# Patient Record
Sex: MALE | Race: WHITE | Employment: OTHER | URBAN - METROPOLITAN AREA
[De-identification: names, ages, dates, MRNs, and addresses within clinical notes are randomized per-mention and may not be internally consistent; named-entity substitution may affect disease eponyms.]

---

## 2014-07-14 LAB
CREATININE, URINE: 44
MICROALBUMIN/CREAT 24H UR: 0.2 MG/G{CREAT}
MICROALBUMIN/CREAT UR-RTO: NORMAL

## 2016-09-14 LAB
AVERAGE GLUCOSE: NORMAL
CHOLESTEROL, TOTAL: NORMAL
CHOLESTEROL/HDL RATIO: NORMAL
HBA1C MFR BLD: 5.7 %
HDLC SERPL-MCNC: NORMAL MG/DL
LDL CHOLESTEROL CALCULATED: NORMAL
TRIGL SERPL-MCNC: NORMAL MG/DL
VLDLC SERPL CALC-MCNC: NORMAL MG/DL

## 2018-04-09 ENCOUNTER — HOSPITAL ENCOUNTER (OUTPATIENT)
Dept: PULMONOLOGY | Age: 68
Discharge: HOME | End: 2018-04-09
Payer: COMMERCIAL

## 2018-04-09 PROCEDURE — 94642 AEROSOL INHALATION TREATMENT: CPT

## 2018-05-03 ENCOUNTER — HOSPITAL ENCOUNTER (OUTPATIENT)
Dept: PULMONOLOGY | Age: 68
Discharge: HOME | End: 2018-05-03

## 2018-05-10 ENCOUNTER — HOSPITAL ENCOUNTER (OUTPATIENT)
Dept: PULMONOLOGY | Age: 68
Discharge: HOME | End: 2018-05-10
Payer: COMMERCIAL

## 2018-05-10 PROCEDURE — 94640 AIRWAY INHALATION TREATMENT: CPT

## 2018-05-10 PROCEDURE — 94642 AEROSOL INHALATION TREATMENT: CPT

## 2018-06-11 ENCOUNTER — HOSPITAL ENCOUNTER (OUTPATIENT)
Dept: PULMONOLOGY | Age: 68
Discharge: HOME | End: 2018-06-11
Payer: COMMERCIAL

## 2018-06-11 PROCEDURE — 94642 AEROSOL INHALATION TREATMENT: CPT

## 2018-06-11 PROCEDURE — 94640 AIRWAY INHALATION TREATMENT: CPT

## 2018-07-12 ENCOUNTER — HOSPITAL ENCOUNTER (OUTPATIENT)
Dept: PULMONOLOGY | Age: 68
Discharge: HOME | End: 2018-07-12
Payer: COMMERCIAL

## 2018-07-12 PROCEDURE — 94640 AIRWAY INHALATION TREATMENT: CPT

## 2018-07-17 ENCOUNTER — HOSPITAL ENCOUNTER (OUTPATIENT)
Dept: PULMONOLOGY | Age: 68
Discharge: HOME | End: 2018-07-17

## 2018-07-19 VITALS
BODY MASS INDEX: 27.66 KG/M2 | HEIGHT: 64 IN | RESPIRATION RATE: 18 BRPM | SYSTOLIC BLOOD PRESSURE: 110 MMHG | OXYGEN SATURATION: 99 % | HEART RATE: 66 BPM | DIASTOLIC BLOOD PRESSURE: 60 MMHG | WEIGHT: 162 LBS

## 2018-08-02 ENCOUNTER — HOSPITAL ENCOUNTER (OUTPATIENT)
Age: 68
Discharge: HOME | End: 2018-08-02

## 2018-08-02 PROCEDURE — 9900000058 HC PULMONARY REHAB PHASE 3

## 2018-08-16 ENCOUNTER — HOSPITAL ENCOUNTER (OUTPATIENT)
Dept: PULMONOLOGY | Age: 68
Discharge: HOME | End: 2018-08-16
Payer: COMMERCIAL

## 2018-08-16 PROCEDURE — 94642 AEROSOL INHALATION TREATMENT: CPT

## 2018-09-04 ENCOUNTER — HOSPITAL ENCOUNTER (OUTPATIENT)
Age: 68
Discharge: HOME | End: 2018-09-04

## 2018-09-04 PROCEDURE — 9900000058 HC PULMONARY REHAB PHASE 3

## 2018-09-17 ENCOUNTER — HOSPITAL ENCOUNTER (OUTPATIENT)
Dept: PULMONOLOGY | Age: 68
Discharge: HOME | End: 2018-09-17
Payer: COMMERCIAL

## 2018-09-17 PROCEDURE — 94642 AEROSOL INHALATION TREATMENT: CPT

## 2018-10-11 ENCOUNTER — APPOINTMENT (OUTPATIENT)
Dept: PULMONOLOGY | Age: 68
End: 2018-10-11
Payer: COMMERCIAL

## 2018-10-11 ENCOUNTER — HOSPITAL ENCOUNTER (OUTPATIENT)
Dept: PULMONOLOGY | Age: 68
Setting detail: THERAPIES SERIES
Discharge: HOME | End: 2018-10-11
Payer: COMMERCIAL

## 2018-10-16 ENCOUNTER — HOSPITAL ENCOUNTER (OUTPATIENT)
Dept: PULMONOLOGY | Age: 68
Setting detail: THERAPIES SERIES
Discharge: HOME | End: 2018-10-16
Payer: COMMERCIAL

## 2018-10-16 PROCEDURE — G0424 PULMONARY REHAB W EXER: HCPCS

## 2018-10-18 ENCOUNTER — HOSPITAL ENCOUNTER (OUTPATIENT)
Dept: PULMONOLOGY | Age: 68
Discharge: HOME | End: 2018-10-18
Payer: COMMERCIAL

## 2018-10-18 PROCEDURE — 94640 AIRWAY INHALATION TREATMENT: CPT

## 2018-10-18 PROCEDURE — 94642 AEROSOL INHALATION TREATMENT: CPT

## 2018-10-23 ENCOUNTER — HOSPITAL ENCOUNTER (OUTPATIENT)
Dept: PULMONOLOGY | Age: 68
Setting detail: THERAPIES SERIES
Discharge: HOME | End: 2018-10-23
Payer: COMMERCIAL

## 2018-10-23 PROCEDURE — G0424 PULMONARY REHAB W EXER: HCPCS

## 2018-10-25 ENCOUNTER — HOSPITAL ENCOUNTER (OUTPATIENT)
Dept: PULMONOLOGY | Age: 68
Setting detail: THERAPIES SERIES
Discharge: HOME | End: 2018-10-25
Payer: COMMERCIAL

## 2018-10-25 PROCEDURE — G0424 PULMONARY REHAB W EXER: HCPCS

## 2018-11-06 ENCOUNTER — HOSPITAL ENCOUNTER (OUTPATIENT)
Dept: PULMONOLOGY | Age: 68
Setting detail: THERAPIES SERIES
Discharge: HOME | End: 2018-11-06
Payer: COMMERCIAL

## 2018-11-06 PROCEDURE — G0424 PULMONARY REHAB W EXER: HCPCS

## 2018-11-12 ENCOUNTER — HOSPITAL ENCOUNTER (OUTPATIENT)
Dept: PULMONOLOGY | Age: 68
Discharge: HOME | End: 2018-11-12
Payer: COMMERCIAL

## 2018-11-12 PROCEDURE — 94642 AEROSOL INHALATION TREATMENT: CPT

## 2018-11-12 PROCEDURE — 94664 DEMO&/EVAL PT USE INHALER: CPT

## 2018-11-27 ENCOUNTER — HOSPITAL ENCOUNTER (OUTPATIENT)
Dept: PULMONOLOGY | Age: 68
Setting detail: THERAPIES SERIES
Discharge: HOME | End: 2018-11-27
Payer: COMMERCIAL

## 2018-11-27 PROCEDURE — G0424 PULMONARY REHAB W EXER: HCPCS

## 2018-11-29 ENCOUNTER — HOSPITAL ENCOUNTER (OUTPATIENT)
Dept: PULMONOLOGY | Age: 68
Setting detail: THERAPIES SERIES
Discharge: HOME | End: 2018-11-29
Payer: COMMERCIAL

## 2018-11-29 PROCEDURE — G0424 PULMONARY REHAB W EXER: HCPCS

## 2018-12-04 ENCOUNTER — HOSPITAL ENCOUNTER (OUTPATIENT)
Dept: PULMONOLOGY | Age: 68
Setting detail: THERAPIES SERIES
Discharge: HOME | End: 2018-12-04
Payer: COMMERCIAL

## 2018-12-04 PROCEDURE — G0424 PULMONARY REHAB W EXER: HCPCS

## 2018-12-18 ENCOUNTER — HOSPITAL ENCOUNTER (OUTPATIENT)
Dept: PULMONOLOGY | Age: 68
Setting detail: THERAPIES SERIES
Discharge: HOME | End: 2018-12-18
Payer: COMMERCIAL

## 2018-12-18 PROCEDURE — G0424 PULMONARY REHAB W EXER: HCPCS

## 2018-12-20 ENCOUNTER — HOSPITAL ENCOUNTER (OUTPATIENT)
Dept: PULMONOLOGY | Age: 68
Setting detail: THERAPIES SERIES
Discharge: HOME | End: 2018-12-20
Payer: COMMERCIAL

## 2018-12-20 ENCOUNTER — HOSPITAL ENCOUNTER (OUTPATIENT)
Dept: PULMONOLOGY | Age: 68
Discharge: HOME | End: 2018-12-20
Payer: COMMERCIAL

## 2018-12-20 PROCEDURE — G0424 PULMONARY REHAB W EXER: HCPCS

## 2018-12-20 PROCEDURE — 94642 AEROSOL INHALATION TREATMENT: CPT

## 2018-12-20 PROCEDURE — 94664 DEMO&/EVAL PT USE INHALER: CPT

## 2018-12-27 ENCOUNTER — HOSPITAL ENCOUNTER (OUTPATIENT)
Dept: PULMONOLOGY | Age: 68
Setting detail: THERAPIES SERIES
Discharge: HOME | End: 2018-12-27
Payer: COMMERCIAL

## 2018-12-27 PROCEDURE — G0424 PULMONARY REHAB W EXER: HCPCS

## 2019-01-03 ENCOUNTER — HOSPITAL ENCOUNTER (OUTPATIENT)
Age: 69
Discharge: HOME | End: 2019-01-03

## 2019-01-03 PROCEDURE — 9900000058 HC PULMONARY REHAB PHASE 3

## 2019-01-21 ENCOUNTER — HOSPITAL ENCOUNTER (OUTPATIENT)
Age: 69
Discharge: HOME | End: 2019-01-21
Payer: COMMERCIAL

## 2019-01-21 PROCEDURE — 94664 DEMO&/EVAL PT USE INHALER: CPT

## 2019-01-21 PROCEDURE — 94642 AEROSOL INHALATION TREATMENT: CPT

## 2019-01-30 LAB

## 2019-02-05 ENCOUNTER — HOSPITAL ENCOUNTER (OUTPATIENT)
Age: 69
Discharge: HOME | End: 2019-02-05

## 2019-02-22 ENCOUNTER — HOSPITAL ENCOUNTER (OUTPATIENT)
Dept: PULMONOLOGY | Age: 69
Discharge: HOME | End: 2019-02-22
Payer: COMMERCIAL

## 2019-02-22 PROCEDURE — 94642 AEROSOL INHALATION TREATMENT: CPT

## 2019-03-05 ENCOUNTER — HOSPITAL ENCOUNTER (OUTPATIENT)
Age: 69
Discharge: HOME | End: 2019-03-05

## 2019-03-22 ENCOUNTER — HOSPITAL ENCOUNTER (OUTPATIENT)
Dept: PULMONOLOGY | Age: 69
Discharge: HOME | End: 2019-03-22
Payer: COMMERCIAL

## 2019-03-22 PROCEDURE — 94640 AIRWAY INHALATION TREATMENT: CPT

## 2019-03-22 PROCEDURE — 94642 AEROSOL INHALATION TREATMENT: CPT

## 2019-04-22 ENCOUNTER — HOSPITAL ENCOUNTER (OUTPATIENT)
Dept: PULMONOLOGY | Age: 69
Discharge: HOME | End: 2019-04-22
Payer: COMMERCIAL

## 2019-04-22 PROCEDURE — 94642 AEROSOL INHALATION TREATMENT: CPT

## 2019-05-23 ENCOUNTER — HOSPITAL ENCOUNTER (OUTPATIENT)
Dept: PULMONOLOGY | Age: 69
Discharge: HOME | End: 2019-05-23
Payer: COMMERCIAL

## 2019-05-23 PROCEDURE — 94664 DEMO&/EVAL PT USE INHALER: CPT

## 2019-05-23 PROCEDURE — 94642 AEROSOL INHALATION TREATMENT: CPT

## 2019-06-21 ENCOUNTER — HOSPITAL ENCOUNTER (OUTPATIENT)
Dept: PULMONOLOGY | Age: 69
Discharge: HOME | End: 2019-06-21
Payer: COMMERCIAL

## 2019-06-21 VITALS — OXYGEN SATURATION: 97 % | RESPIRATION RATE: 18 BRPM

## 2019-06-21 PROCEDURE — 94640 AIRWAY INHALATION TREATMENT: CPT

## 2019-06-21 PROCEDURE — 94642 AEROSOL INHALATION TREATMENT: CPT

## 2019-07-22 ENCOUNTER — HOSPITAL ENCOUNTER (OUTPATIENT)
Dept: PULMONOLOGY | Age: 69
Discharge: HOME | End: 2019-07-22
Payer: COMMERCIAL

## 2019-07-22 PROCEDURE — 94642 AEROSOL INHALATION TREATMENT: CPT

## 2019-07-22 PROCEDURE — 94664 DEMO&/EVAL PT USE INHALER: CPT

## 2019-08-22 ENCOUNTER — HOSPITAL ENCOUNTER (OUTPATIENT)
Dept: PULMONOLOGY | Age: 69
Discharge: HOME | End: 2019-08-22
Payer: COMMERCIAL

## 2019-08-22 PROCEDURE — 94642 AEROSOL INHALATION TREATMENT: CPT

## 2019-09-23 ENCOUNTER — HOSPITAL ENCOUNTER (OUTPATIENT)
Dept: PULMONOLOGY | Age: 69
Discharge: HOME | End: 2019-09-23
Payer: COMMERCIAL

## 2019-09-23 PROCEDURE — 94640 AIRWAY INHALATION TREATMENT: CPT

## 2019-09-23 PROCEDURE — 94642 AEROSOL INHALATION TREATMENT: CPT

## 2019-10-21 ENCOUNTER — HOSPITAL ENCOUNTER (OUTPATIENT)
Dept: PULMONOLOGY | Age: 69
Discharge: HOME | End: 2019-10-21
Payer: COMMERCIAL

## 2019-10-21 PROCEDURE — 94642 AEROSOL INHALATION TREATMENT: CPT

## 2019-10-21 PROCEDURE — 94640 AIRWAY INHALATION TREATMENT: CPT

## 2019-11-21 ENCOUNTER — HOSPITAL ENCOUNTER (OUTPATIENT)
Dept: PULMONOLOGY | Age: 69
Discharge: HOME | End: 2019-11-21
Payer: COMMERCIAL

## 2019-11-21 PROCEDURE — 94642 AEROSOL INHALATION TREATMENT: CPT

## 2019-11-22 ENCOUNTER — OFFICE VISIT (OUTPATIENT)
Dept: FAMILY MEDICINE CLINIC | Age: 69
End: 2019-11-22
Payer: COMMERCIAL

## 2019-11-22 VITALS
BODY MASS INDEX: 30.42 KG/M2 | HEART RATE: 56 BPM | DIASTOLIC BLOOD PRESSURE: 72 MMHG | WEIGHT: 178.2 LBS | OXYGEN SATURATION: 98 % | HEIGHT: 64 IN | SYSTOLIC BLOOD PRESSURE: 138 MMHG | TEMPERATURE: 98.3 F

## 2019-11-22 DIAGNOSIS — F34.1 DYSTHYMIA: ICD-10-CM

## 2019-11-22 DIAGNOSIS — I25.84 CORONARY ARTERY DISEASE DUE TO CALCIFIED CORONARY LESION: ICD-10-CM

## 2019-11-22 DIAGNOSIS — Z94.2 LUNG TRANSPLANT RECIPIENT (HCC): Primary | ICD-10-CM

## 2019-11-22 DIAGNOSIS — K21.9 GASTROESOPHAGEAL REFLUX DISEASE, ESOPHAGITIS PRESENCE NOT SPECIFIED: ICD-10-CM

## 2019-11-22 DIAGNOSIS — I25.10 CORONARY ARTERY DISEASE DUE TO CALCIFIED CORONARY LESION: ICD-10-CM

## 2019-11-22 PROCEDURE — 99214 OFFICE O/P EST MOD 30 MIN: CPT | Performed by: FAMILY MEDICINE

## 2019-11-22 RX ORDER — MONTELUKAST SODIUM 10 MG/1
10 TABLET ORAL NIGHTLY
COMMUNITY
End: 2020-11-16

## 2019-11-22 RX ORDER — CYCLOSPORINE 25 MG/1
25 CAPSULE, GELATIN COATED ORAL DAILY
COMMUNITY

## 2019-11-22 RX ORDER — CYCLOSPORINE 100 MG/1
100 CAPSULE, GELATIN COATED ORAL 2 TIMES DAILY
COMMUNITY

## 2019-11-22 RX ORDER — ONDANSETRON 4 MG/1
4 TABLET, ORALLY DISINTEGRATING ORAL EVERY 8 HOURS PRN
COMMUNITY
End: 2021-06-02

## 2019-11-22 RX ORDER — METOPROLOL SUCCINATE 25 MG/1
12.5 TABLET, EXTENDED RELEASE ORAL DAILY
COMMUNITY
End: 2021-10-27

## 2019-11-22 RX ORDER — NITROGLYCERIN 0.4 MG/1
0.4 TABLET SUBLINGUAL EVERY 5 MIN PRN
COMMUNITY

## 2019-11-22 RX ORDER — AZITHROMYCIN 250 MG/1
250 TABLET, FILM COATED ORAL
COMMUNITY
End: 2020-05-15

## 2019-11-22 RX ORDER — LANOLIN ALCOHOL/MO/W.PET/CERES
400 CREAM (GRAM) TOPICAL DAILY
COMMUNITY

## 2019-11-22 RX ORDER — VALGANCICLOVIR 450 MG/1
450 TABLET, FILM COATED ORAL
COMMUNITY

## 2019-11-22 RX ORDER — METOLAZONE 5 MG/1
5 TABLET ORAL WEEKLY
COMMUNITY
End: 2020-05-15

## 2019-11-22 RX ORDER — GUAIFENESIN 400 MG/1
400 TABLET ORAL 4 TIMES DAILY PRN
COMMUNITY

## 2019-11-22 RX ORDER — OLANZAPINE 5 MG/1
5 TABLET ORAL NIGHTLY PRN
COMMUNITY
End: 2020-05-15

## 2019-11-22 RX ORDER — FUROSEMIDE 40 MG/1
80 TABLET ORAL
COMMUNITY
Start: 2019-08-28 | End: 2021-10-27

## 2019-11-22 RX ORDER — ISOSORBIDE DINITRATE 10 MG/1
5 TABLET ORAL 3 TIMES DAILY
COMMUNITY
End: 2022-05-25 | Stop reason: ALTCHOICE

## 2019-11-22 RX ORDER — PREDNISONE 1 MG/1
5 TABLET ORAL DAILY
COMMUNITY
End: 2022-05-25 | Stop reason: SDUPTHER

## 2019-11-22 RX ORDER — DOXYCYCLINE HYCLATE 50 MG/1
324 CAPSULE, GELATIN COATED ORAL
COMMUNITY
End: 2020-05-15

## 2019-11-22 RX ORDER — CALCIUM CARBONATE 200(500)MG
2 TABLET,CHEWABLE ORAL DAILY
COMMUNITY

## 2019-11-22 RX ORDER — PANTOPRAZOLE SODIUM 40 MG/1
40 TABLET, DELAYED RELEASE ORAL 2 TIMES DAILY
COMMUNITY
End: 2021-03-08

## 2019-11-22 RX ORDER — FLUOXETINE HYDROCHLORIDE 20 MG/1
20 CAPSULE ORAL DAILY
COMMUNITY
End: 2020-05-15 | Stop reason: SDUPTHER

## 2019-11-22 RX ORDER — FLUTICASONE PROPIONATE 50 MCG
1 SPRAY, SUSPENSION (ML) NASAL DAILY
COMMUNITY

## 2019-11-22 RX ORDER — PENTAMIDINE ISETHIONATE 300 MG/300MG
300 INHALANT RESPIRATORY (INHALATION) ONCE
COMMUNITY
End: 2020-05-15

## 2019-11-22 RX ORDER — HYDRALAZINE HYDROCHLORIDE 25 MG/1
TABLET, FILM COATED ORAL 3 TIMES DAILY
COMMUNITY
End: 2021-10-27

## 2019-11-22 ASSESSMENT — ENCOUNTER SYMPTOMS
SINUS PAIN: 0
NAUSEA: 0
TROUBLE SWALLOWING: 0
CONSTIPATION: 0
BACK PAIN: 0
WHEEZING: 0
SHORTNESS OF BREATH: 1
COUGH: 0
EYE PAIN: 0
DIARRHEA: 0
VOMITING: 0
ABDOMINAL PAIN: 0
COLOR CHANGE: 0
EYE DISCHARGE: 0
ALLERGIC/IMMUNOLOGIC NEGATIVE: 1
SORE THROAT: 0
CHEST TIGHTNESS: 0

## 2019-12-05 ENCOUNTER — OFFICE VISIT (OUTPATIENT)
Dept: FAMILY MEDICINE CLINIC | Age: 69
End: 2019-12-05
Payer: COMMERCIAL

## 2019-12-05 VITALS
DIASTOLIC BLOOD PRESSURE: 84 MMHG | HEART RATE: 87 BPM | BODY MASS INDEX: 27.45 KG/M2 | OXYGEN SATURATION: 100 % | SYSTOLIC BLOOD PRESSURE: 128 MMHG | TEMPERATURE: 98.4 F | WEIGHT: 160.8 LBS | HEIGHT: 64 IN

## 2019-12-05 DIAGNOSIS — R05.9 COUGH: Primary | ICD-10-CM

## 2019-12-05 LAB
INFLUENZA A ANTIBODY: NEGATIVE
INFLUENZA B ANTIBODY: NEGATIVE
RSV RAPID ANTIGEN: NEGATIVE

## 2019-12-05 PROCEDURE — 87804 INFLUENZA ASSAY W/OPTIC: CPT | Performed by: FAMILY MEDICINE

## 2019-12-05 PROCEDURE — 99213 OFFICE O/P EST LOW 20 MIN: CPT | Performed by: FAMILY MEDICINE

## 2019-12-05 PROCEDURE — 87807 RSV ASSAY W/OPTIC: CPT | Performed by: FAMILY MEDICINE

## 2019-12-05 RX ORDER — PREDNISONE 10 MG/1
10 TABLET ORAL
Qty: 15 TABLET | Refills: 0 | Status: SHIPPED | OUTPATIENT
Start: 2019-12-05 | End: 2019-12-10

## 2019-12-05 ASSESSMENT — ENCOUNTER SYMPTOMS
SINUS PAIN: 0
EYE PAIN: 0
VOMITING: 0
SORE THROAT: 0
COUGH: 1
TROUBLE SWALLOWING: 0
COLOR CHANGE: 0
CHEST TIGHTNESS: 0
CONSTIPATION: 0
EYE DISCHARGE: 0
SHORTNESS OF BREATH: 1
BACK PAIN: 0
DIARRHEA: 0
NAUSEA: 0
ALLERGIC/IMMUNOLOGIC NEGATIVE: 1
WHEEZING: 0
ABDOMINAL PAIN: 0

## 2019-12-23 ENCOUNTER — HOSPITAL ENCOUNTER (OUTPATIENT)
Dept: PULMONOLOGY | Age: 69
Discharge: HOME | End: 2019-12-23
Payer: COMMERCIAL

## 2019-12-23 PROCEDURE — 94642 AEROSOL INHALATION TREATMENT: CPT

## 2019-12-23 PROCEDURE — 94664 DEMO&/EVAL PT USE INHALER: CPT

## 2020-01-07 ENCOUNTER — TELEPHONE (OUTPATIENT)
Dept: FAMILY MEDICINE CLINIC | Age: 70
End: 2020-01-07

## 2020-01-21 ENCOUNTER — OFFICE VISIT (OUTPATIENT)
Dept: FAMILY MEDICINE CLINIC | Age: 70
End: 2020-01-21
Payer: MEDICARE

## 2020-01-21 VITALS
SYSTOLIC BLOOD PRESSURE: 130 MMHG | OXYGEN SATURATION: 98 % | DIASTOLIC BLOOD PRESSURE: 70 MMHG | HEART RATE: 64 BPM | TEMPERATURE: 97.8 F

## 2020-01-21 PROCEDURE — G0444 DEPRESSION SCREEN ANNUAL: HCPCS | Performed by: FAMILY MEDICINE

## 2020-01-21 PROCEDURE — 99213 OFFICE O/P EST LOW 20 MIN: CPT | Performed by: FAMILY MEDICINE

## 2020-01-21 RX ORDER — PREDNISONE 10 MG/1
10 TABLET ORAL
Qty: 15 TABLET | Refills: 0 | Status: SHIPPED | OUTPATIENT
Start: 2020-01-21 | End: 2020-01-26

## 2020-01-21 ASSESSMENT — PATIENT HEALTH QUESTIONNAIRE - PHQ9
2. FEELING DOWN, DEPRESSED OR HOPELESS: 1
SUM OF ALL RESPONSES TO PHQ9 QUESTIONS 1 & 2: 3
8. MOVING OR SPEAKING SO SLOWLY THAT OTHER PEOPLE COULD HAVE NOTICED. OR THE OPPOSITE, BEING SO FIGETY OR RESTLESS THAT YOU HAVE BEEN MOVING AROUND A LOT MORE THAN USUAL: 1
SUM OF ALL RESPONSES TO PHQ QUESTIONS 1-9: 8
9. THOUGHTS THAT YOU WOULD BE BETTER OFF DEAD, OR OF HURTING YOURSELF: 1
SUM OF ALL RESPONSES TO PHQ QUESTIONS 1-9: 8
1. LITTLE INTEREST OR PLEASURE IN DOING THINGS: 2
3. TROUBLE FALLING OR STAYING ASLEEP: 0
10. IF YOU CHECKED OFF ANY PROBLEMS, HOW DIFFICULT HAVE THESE PROBLEMS MADE IT FOR YOU TO DO YOUR WORK, TAKE CARE OF THINGS AT HOME, OR GET ALONG WITH OTHER PEOPLE: 0
4. FEELING TIRED OR HAVING LITTLE ENERGY: 3
6. FEELING BAD ABOUT YOURSELF - OR THAT YOU ARE A FAILURE OR HAVE LET YOURSELF OR YOUR FAMILY DOWN: 0
7. TROUBLE CONCENTRATING ON THINGS, SUCH AS READING THE NEWSPAPER OR WATCHING TELEVISION: 0

## 2020-01-21 ASSESSMENT — ENCOUNTER SYMPTOMS
ALLERGIC/IMMUNOLOGIC NEGATIVE: 1
EYE DISCHARGE: 0
BACK PAIN: 0
CONSTIPATION: 0
ABDOMINAL PAIN: 0
COLOR CHANGE: 0
VOMITING: 0
TROUBLE SWALLOWING: 0
SORE THROAT: 0
EYE PAIN: 0
CHEST TIGHTNESS: 0
WHEEZING: 0
SINUS PAIN: 0
SHORTNESS OF BREATH: 0
NAUSEA: 0
DIARRHEA: 0
COUGH: 0

## 2020-01-21 ASSESSMENT — COLUMBIA-SUICIDE SEVERITY RATING SCALE - C-SSRS
6. HAVE YOU EVER DONE ANYTHING, STARTED TO DO ANYTHING, OR PREPARED TO DO ANYTHING TO END YOUR LIFE?: NO
2. HAVE YOU ACTUALLY HAD ANY THOUGHTS OF KILLING YOURSELF?: NO
1. WITHIN THE PAST MONTH, HAVE YOU WISHED YOU WERE DEAD OR WISHED YOU COULD GO TO SLEEP AND NOT WAKE UP?: YES

## 2020-01-21 NOTE — PROGRESS NOTES
20    Loulou King : 1950 Sex: male  Age: 78 y.o. Chief Complaint   Patient presents with    Toe Pain     right    Other     phq 9 score - 8       HPI:  78 y.o. male here with pain in right foot-first mt-p joint. On dialysis now. Sees [facility] monthly. Review of Systems   Constitutional: Negative for chills, diaphoresis and fever. HENT: Negative for congestion, ear pain, sinus pain, sneezing, sore throat, tinnitus and trouble swallowing. Eyes: Negative for pain, discharge and visual disturbance. Respiratory: Negative for cough, chest tightness, shortness of breath and wheezing. Cardiovascular: Negative for chest pain, palpitations and leg swelling. Gastrointestinal: Negative for abdominal pain, constipation, diarrhea, nausea and vomiting. Endocrine: Negative for polydipsia and polyuria. Genitourinary: Negative for difficulty urinating, flank pain and frequency. Musculoskeletal: Negative for arthralgias, back pain, joint swelling and myalgias. Right foot pain. Skin: Negative for color change and rash. Allergic/Immunologic: Negative. Neurological: Negative for dizziness, tremors, seizures, syncope, weakness and light-headedness. Hematological: Negative for adenopathy. Psychiatric/Behavioral: Negative for behavioral problems, dysphoric mood and hallucinations. The patient is not nervous/anxious.           Current Outpatient Medications:     predniSONE (DELTASONE) 10 MG tablet, Take 1 tablet by mouth 3 times daily (with meals) for 5 days, Disp: 15 tablet, Rfl: 0    furosemide (LASIX) 40 MG tablet, Take 80 mg by mouth , Disp: , Rfl:     metOLazone (ZAROXOLYN) 5 MG tablet, Take 5 mg by mouth once a week, Disp: , Rfl:     ondansetron (ZOFRAN-ODT) 4 MG disintegrating tablet, Take 4 mg by mouth every 8 hours as needed for Nausea or Vomiting, Disp: , Rfl:     azithromycin (ZITHROMAX) 250 MG tablet, Take 250 mg by mouth, Disp: , Rfl:     isosorbide dinitrate (ISORDIL) 10 MG tablet, Take 5 mg by mouth 3 times daily, Disp: , Rfl:     folic acid (FOLVITE) 404 MCG tablet, Take 400 mcg by mouth daily, Disp: , Rfl:     ferrous gluconate (FERGON) 324 (38 Fe) MG tablet, Take 324 mg by mouth daily (with breakfast), Disp: , Rfl:     aspirin 81 MG tablet, Take 81 mg by mouth daily, Disp: , Rfl:     metoprolol succinate (TOPROL XL) 25 MG extended release tablet, Take 25 mg by mouth daily, Disp: , Rfl:     fluticasone (FLONASE) 50 MCG/ACT nasal spray, 1 spray by Each Nostril route daily, Disp: , Rfl:     cycloSPORINE (SANDIMMUNE) 100 MG capsule, Take 100 mg by mouth 2 times daily, Disp: , Rfl:     cycloSPORINE (SANDIMMUNE) 25 MG capsule, Take 25 mg by mouth daily, Disp: , Rfl:     valGANciclovir (VALCYTE) 450 MG tablet, Take 450 mg by mouth, Disp: , Rfl:     montelukast (SINGULAIR) 10 MG tablet, Take 10 mg by mouth nightly, Disp: , Rfl:     calcium carbonate (TUMS) 500 MG chewable tablet, Take 2 tablets by mouth daily, Disp: , Rfl:     predniSONE (DELTASONE) 5 MG tablet, Take 5 mg by mouth daily, Disp: , Rfl:     hydrALAZINE (APRESOLINE) 50 MG tablet, Take 25 mg by mouth 3 times daily , Disp: , Rfl:     FLUoxetine (PROZAC) 20 MG capsule, Take 20 mg by mouth daily, Disp: , Rfl:     pantoprazole (PROTONIX) 40 MG tablet, Take 40 mg by mouth 2 times daily, Disp: , Rfl:     nitroGLYCERIN (NITROSTAT) 0.4 MG SL tablet, Place 0.4 mg under the tongue every 5 minutes as needed for Chest pain up to max of 3 total doses.  If no relief after 1 dose, call 911., Disp: , Rfl:     OLANZapine (ZYPREXA) 5 MG tablet, Take 5 mg by mouth nightly as needed, Disp: , Rfl:     guaiFENesin 400 MG tablet, Take 400 mg by mouth 4 times daily as needed for Cough, Disp: , Rfl:     Simethicone 40 MG STRP, Take by mouth, Disp: , Rfl:     pentamidine (PENTAM) 300 MG inhalation solution, Inhale 300 mg into the lungs once, Disp: , Rfl:     vitamin D (ERGOCALCIFEROL) 13646 UNITS CAPS capsule, Take 2,000 Units by mouth daily , Disp: , Rfl:     Multiple Vitamins-Minerals (THERAPEUTIC MULTIVITAMIN-MINERALS) tablet, Take 1 tablet by mouth daily, Disp: , Rfl:   Allergies   Allergen Reactions    Ceftriaxone Itching     Itching at IV site & red streak along the arm within 5 minutes of infusion      Heparin Other (See Comments)     MEÑO      Lorazepam Other (See Comments)    Tacrolimus Other (See Comments)     Agitation         Past Medical History:   Diagnosis Date    Emphysema lung ([facility] [address]     Hypertension      Past Surgical History:   Procedure Laterality Date    LUNG TRANSPLANT, DOUBLE  2017     No family history on file.   Social History     Socioeconomic History    Marital status:      Spouse name: Not on file    Number of children: Not on file    Years of education: Not on file    Highest education level: Not on file   Occupational History    Not on file   Social Needs    Financial resource strain: Not on file    Food insecurity:     Worry: Not on file     Inability: Not on file    Transportation needs:     Medical: Not on file     Non-medical: Not on file   Tobacco Use    Smoking status: Former Smoker     Packs/day: 2.00     Years: 25.00     Pack years: 50.00     Last attempt to quit: 1997     Years since quittin.4    Smokeless tobacco: Never Used   Substance and Sexual Activity    Alcohol use: No    Drug use: No    Sexual activity: Not on file   Lifestyle    Physical activity:     Days per week: Not on file     Minutes per session: Not on file    Stress: Not on file   Relationships    Social connections:     Talks on phone: Not on file     Gets together: Not on file     Attends [employer] service: Not on file     Active member of club or organization: Not on file     Attends meetings of clubs or organizations: Not on file     Relationship status: Not on file    Intimate partner violence:     Fear of current or ex partner: Not on file     Emotionally abused: Not on file Physically abused: Not on file     Forced sexual activity: Not on file   Other Topics Concern    Not on file   Social History Narrative    Not on file       Vitals:    01/21/20 1635   BP: 130/70   Pulse: 64   Temp: 97.8 °F (36.6 °C)   TempSrc: Temporal   SpO2: 98%       Physical Exam  Vitals signs reviewed. Constitutional:       Appearance: He is well-developed. HENT:      Head: Normocephalic. Right Ear: Tympanic membrane normal.      Left Ear: Tympanic membrane normal.      Nose: Nose normal.      Comments: Nasal canula noted. Mouth/Throat:      Mouth: Mucous membranes are moist.      Pharynx: No posterior oropharyngeal erythema. Eyes:      Extraocular Movements: Extraocular movements intact. Conjunctiva/sclera: Conjunctivae normal.      Pupils: Pupils are equal, round, and reactive to light. Neck:      Musculoskeletal: Normal range of motion and neck supple. Vascular: No carotid bruit. Cardiovascular:      Rate and Rhythm: Normal rate and regular rhythm. Heart sounds: Normal heart sounds. No murmur. Pulmonary:      Effort: Pulmonary effort is normal.      Breath sounds: Normal breath sounds. Comments: BS diminished. Abdominal:      General: Bowel sounds are normal.      Palpations: Abdomen is soft. Musculoskeletal:      Comments: Erythematous tender right first mt-p joint. Skin:     General: Skin is warm and dry. Neurological:      Mental Status: He is alert and oriented to person, place, and time. POCT Orders   No Active POCT       Assessment and Plan:  Rey Burrell was seen today for toe pain and other. Diagnoses and all orders for this visit:    Right foot pain  -     XR FOOT RIGHT (MIN 3 VIEWS); Future    Other orders  -     predniSONE (DELTASONE) 10 MG tablet; Take 1 tablet by mouth 3 times daily (with meals) for 5 days    Reviewed Pmhx, meds, xrays. Start increase dose of pred. Recheck 2-3 days prn. Return if symptoms worsen or fail to improve.      Seen

## 2020-01-24 ENCOUNTER — HOSPITAL ENCOUNTER (OUTPATIENT)
Dept: PULMONOLOGY | Age: 70
Discharge: HOME | End: 2020-01-24
Payer: MEDICARE

## 2020-01-24 PROCEDURE — 94642 AEROSOL INHALATION TREATMENT: CPT

## 2020-01-24 NOTE — PROGRESS NOTES
Aerosol given with Albuterol and Nss prior to [facility] treatment. Pentamadine treatment given with 6 cc Sterile water. Tolerated well. Patient came in as outpatient to pft lab.

## 2020-01-29 SDOH — HEALTH STABILITY: MENTAL HEALTH: HOW OFTEN DO YOU HAVE A DRINK CONTAINING ALCOHOL?: NEVER

## 2020-02-10 ENCOUNTER — HOSPITAL ENCOUNTER (OUTPATIENT)
Age: 70
Discharge: HOME | End: 2020-02-12
Payer: MEDICARE

## 2020-02-10 ENCOUNTER — TELEPHONE (OUTPATIENT)
Dept: FAMILY MEDICINE CLINIC | Age: 70
End: 2020-02-10

## 2020-02-10 ENCOUNTER — OFFICE VISIT (OUTPATIENT)
Dept: FAMILY MEDICINE CLINIC | Age: 70
End: 2020-02-10
Payer: MEDICARE

## 2020-02-10 VITALS
DIASTOLIC BLOOD PRESSURE: 74 MMHG | WEIGHT: 187 LBS | TEMPERATURE: 97.7 F | BODY MASS INDEX: 31.92 KG/M2 | OXYGEN SATURATION: 98 % | SYSTOLIC BLOOD PRESSURE: 132 MMHG | HEART RATE: 69 BPM | HEIGHT: 64 IN

## 2020-02-10 LAB
INFLUENZA A ANTIBODY: NORMAL
INFLUENZA B ANTIBODY: NORMAL
RSV RAPID ANTIGEN: NORMAL

## 2020-02-10 PROCEDURE — 99215 OFFICE O/P EST HI 40 MIN: CPT | Performed by: FAMILY MEDICINE

## 2020-02-10 PROCEDURE — 87804 INFLUENZA ASSAY W/OPTIC: CPT | Performed by: FAMILY MEDICINE

## 2020-02-10 PROCEDURE — 0100U HC RESPIRPTHGN MULT REV TRANS & AMP PRB TECH 21 TRGT: CPT

## 2020-02-10 PROCEDURE — 87807 RSV ASSAY W/OPTIC: CPT | Performed by: FAMILY MEDICINE

## 2020-02-10 RX ORDER — PREDNISONE 10 MG/1
10 TABLET ORAL
Qty: 15 TABLET | Refills: 0 | Status: SHIPPED | OUTPATIENT
Start: 2020-02-10 | End: 2020-02-15

## 2020-02-10 ASSESSMENT — ENCOUNTER SYMPTOMS
WHEEZING: 0
NAUSEA: 0
SINUS PAIN: 0
COLOR CHANGE: 0
TROUBLE SWALLOWING: 0
DIARRHEA: 0
EYE PAIN: 0
BACK PAIN: 0
SORE THROAT: 0
CONSTIPATION: 0
CHEST TIGHTNESS: 0
EYE DISCHARGE: 0
COUGH: 0
SHORTNESS OF BREATH: 0
ABDOMINAL PAIN: 0
VOMITING: 0
ALLERGIC/IMMUNOLOGIC NEGATIVE: 1

## 2020-02-10 NOTE — PROGRESS NOTES
2/10/20    [facility] : 1950 Sex: male  Age: 78 y.o. Chief Complaint   Patient presents with    Toe Pain     left    Hip Pain     left       HPI:  78 y.o. male here with congestion, left hip/back pain, left great toe pain. [facility] wants a viral panel done. On dialysis now. Sees [facility] monthly. Review of Systems   Constitutional: Negative for chills, diaphoresis and fever. HENT: Negative for congestion, ear pain, sinus pain, sneezing, sore throat, tinnitus and trouble swallowing. Eyes: Negative for pain, discharge and visual disturbance. Respiratory: Negative for cough, chest tightness, shortness of breath and wheezing. Cardiovascular: Negative for chest pain, palpitations and leg swelling. Gastrointestinal: Negative for abdominal pain, constipation, diarrhea, nausea and vomiting. Endocrine: Negative for polydipsia and polyuria. Genitourinary: Negative for difficulty urinating, flank pain and frequency. Musculoskeletal: Negative for arthralgias, back pain, joint swelling and myalgias. Left foot pain. Left hip/low back pain. Skin: Negative for color change and rash. Allergic/Immunologic: Negative. Neurological: Negative for dizziness, tremors, seizures, syncope, weakness and light-headedness. Hematological: Negative for adenopathy. Psychiatric/Behavioral: Negative for behavioral problems, dysphoric mood and hallucinations. The patient is not nervous/anxious.           Current Outpatient Medications:     predniSONE (DELTASONE) 10 MG tablet, Take 1 tablet by mouth 3 times daily (with meals) for 5 days, Disp: 15 tablet, Rfl: 0    furosemide (LASIX) 40 MG tablet, Take 80 mg by mouth , Disp: , Rfl:     metOLazone (ZAROXOLYN) 5 MG tablet, Take 5 mg by mouth once a week, Disp: , Rfl:     ondansetron (ZOFRAN-ODT) 4 MG disintegrating tablet, Take 4 mg by mouth every 8 hours as needed for Nausea or Vomiting, Disp: , Rfl:     azithromycin (ZITHROMAX) 250 MG tablet, Take 250 mg (ERGOCALCIFEROL) 63767 UNITS CAPS capsule, Take 2,000 Units by mouth daily , Disp: , Rfl:     Multiple Vitamins-Minerals (THERAPEUTIC MULTIVITAMIN-MINERALS) tablet, Take 1 tablet by mouth daily, Disp: , Rfl:   Allergies   Allergen Reactions    Ceftriaxone Itching     Itching at IV site & red streak along the arm within 5 minutes of infusion      Heparin Other (See Comments)     MEÑO      Lorazepam Other (See Comments)    Tacrolimus Other (See Comments)     Agitation         Past Medical History:   Diagnosis Date    Allergic rhinitis     CAD (coronary artery disease)     Ish[place] CABS X 2 4/20/17 [facility]    COPD (chronic obstructive pulmonary disease) (HCC)     O2 3L [facility]    Emphysema (subcutaneous) (surgical) resulting from a procedure     [place]    Emphysema lung (HCC)     GERD (gastroesophageal reflux disease)     Hyperlipidemia     Hypertension     IFG (impaired fasting glucose)     Obesity     Pulmonary fibrosis (HCC)     Benign Nodules - restrictive    Vitamin D insufficiency      Past Surgical History:   Procedure Laterality Date    APPENDECTOMY      BRONCHOSCOPY  05/12/2017    Bedside - 6/18/18 - [place] - [facility] - 8/13/18    CABG WITH AORTIC VALVE REPAIR  04/20/2017    X 2 [facility]    CATARACT REMOVAL WITH IMPLANT Bilateral     OS 11/7/18, OD 11/19/18 - [place]    CHOLECYSTECTOMY      DIAPHRAGM SURGERY Left 08/11/2017    LUNG DECORTICATION      LUNG TRANSPLANT, 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    LUNG TRANSPLANT, DOUBLE      THORACOTOMY      TRACHEOSTOMY  05/01/2017    VOCAL CORD SURGERY      Nodules, Skin Lesions - [facility]     Family History   Problem Relation Age of Onset    Heart Failure Mother     Other Father         AAA    Cancer Brother         Lung    Coronary Art Dis Brother     Heart Failure Brother         Methothelioma     Social History     Socioeconomic History    Marital status:      Spouse name: Not on file    Number of children: Not on file    Years of education: Not on file    Highest education level: Not on file   Occupational History    Not on file   Social Needs    Financial resource strain: Not on file    Food insecurity:     Worry: Not on file     Inability: Not on file    Transportation needs:     Medical: Not on file     Non-medical: Not on file   Tobacco Use    Smoking status: Former Smoker     Packs/day: 2.00     Years: 35.00     Pack years: 70.00     Last attempt to quit: 1997     Years since quittin.4    Smokeless tobacco: Never Used    Tobacco comment: Quit 18 years ago   Substance and Sexual Activity    Alcohol use: Never     Frequency: Never    Drug use: Never    Sexual activity: Not on file   Lifestyle    Physical activity:     Days per week: Not on file     Minutes per session: Not on file    Stress: Not on file   Relationships    Social connections:     Talks on phone: Not on file     Gets together: Not on file     Attends [employer] service: Not on file     Active member of club or organization: Not on file     Attends meetings of clubs or organizations: Not on file     Relationship status: Not on file    Intimate partner violence:     Fear of current or ex partner: Not on file     Emotionally abused: Not on file     Physically abused: Not on file     Forced sexual activity: Not on file   Other Topics Concern    Not on file   Social History Narrative    Not on file       Vitals:    02/10/20 1326   BP: 132/74   Pulse: 69   Temp: 97.7 °F (36.5 °C)   TempSrc: Temporal   SpO2: 98%   Weight: 187 lb (84.8 kg)   Height: 5' 4\" (1.626 m)       Physical Exam  Vitals signs reviewed. Constitutional:       Appearance: He is well-developed. HENT:      Head: Normocephalic. Right Ear: Tympanic membrane normal.      Left Ear: Tympanic membrane normal.      Nose: Nose normal.      Comments: Nasal canula noted. Mouth/Throat:      Mouth: Mucous membranes are moist.      Pharynx: Posterior oropharyngeal erythema present.  No oropharyngeal exudate. Eyes:      Extraocular Movements: Extraocular movements intact. Conjunctiva/sclera: Conjunctivae normal.      Pupils: Pupils are equal, round, and reactive to light. Neck:      Musculoskeletal: Neck supple. Vascular: No carotid bruit. Cardiovascular:      Rate and Rhythm: Normal rate and regular rhythm. Heart sounds: Normal heart sounds. No murmur. Pulmonary:      Effort: Pulmonary effort is normal.      Breath sounds: Normal breath sounds. Comments: BS diminished. Abdominal:      General: Bowel sounds are normal.      Palpations: Abdomen is soft. Musculoskeletal:      Comments: Erythematous tender left first mt-p joint. Paralumbar hypertonicity with decreased ROM in all planes. Negative SLR bilaterally. Skin:     General: Skin is warm and dry. Neurological:      Mental Status: He is alert and oriented to person, place, and time. POCT Orders   No Active POCT       Assessment and Plan:  Elayne Vergara was seen today for toe pain and hip pain. Diagnoses and all orders for this visit:    Acute gout of left foot, unspecified cause  -     XR FOOT LEFT (MIN 3 VIEWS); Future    Left hip pain  -     XR LUMBAR SPINE (2-3 VIEWS); Future  -     XR HIP LEFT (2-3 VIEWS); Future    Cough in adult  -     Respiratory Panel, Film Array; Future  -     POCT Influenza A/B  -     POCT Respiratory Syncytial Virus    Lung transplant recipient [facility])    Coronary artery disease due to calcified coronary lesion    Gastroesophageal reflux disease, esophagitis presence not specified    Dysthymia    ESRD (end stage renal disease) ([facility] [address]    Other orders  -     predniSONE (DELTASONE) 10 MG tablet; Take 1 tablet by mouth 3 times daily (with meals) for 5 days    Reviewed Pmhx, meds, xrays. Influ-neg. RSV neg. Sent out viral panel. Reviewed xrays. Restart pred. Recheck one week. No follow-ups on file.      Seen By:  Bakari Benitez DO

## 2020-02-11 LAB
ADENOVIRUS BY PCR: NOT DETECTED
BORDETELLA PARAPERTUSSIS BY PCR: NOT DETECTED
BORDETELLA PERTUSSIS BY PCR: NOT DETECTED
CHLAMYDOPHILIA PNEUMONIAE BY PCR: NOT DETECTED
CORONAVIRUS 229E BY PCR: NOT DETECTED
CORONAVIRUS HKU1 BY PCR: NOT DETECTED
CORONAVIRUS NL63 BY PCR: NOT DETECTED
CORONAVIRUS OC43 BY PCR: DETECTED
HUMAN METAPNEUMOVIRUS BY PCR: NOT DETECTED
HUMAN RHINOVIRUS/ENTEROVIRUS BY PCR: NOT DETECTED
INFLUENZA A BY PCR: NOT DETECTED
INFLUENZA B BY PCR: NOT DETECTED
MYCOPLASMA PNEUMONIAE BY PCR: NOT DETECTED
PARAINFLUENZA VIRUS 1 BY PCR: NOT DETECTED
PARAINFLUENZA VIRUS 2 BY PCR: NOT DETECTED
PARAINFLUENZA VIRUS 3 BY PCR: NOT DETECTED
PARAINFLUENZA VIRUS 4 BY PCR: NOT DETECTED
RESPIRATORY SYNCYTIAL VIRUS BY PCR: NOT DETECTED

## 2020-02-21 ENCOUNTER — HOSPITAL ENCOUNTER (OUTPATIENT)
Dept: PULMONOLOGY | Age: 70
Discharge: HOME | End: 2020-02-21
Payer: MEDICARE

## 2020-02-21 PROCEDURE — 94640 AIRWAY INHALATION TREATMENT: CPT

## 2020-03-06 ENCOUNTER — HOSPITAL ENCOUNTER (OUTPATIENT)
Age: 70
Discharge: HOME | End: 2020-03-08
Payer: MEDICARE

## 2020-03-06 ENCOUNTER — NURSE TRIAGE (OUTPATIENT)
Dept: OTHER | Facility: CLINIC | Age: 70
End: 2020-03-06

## 2020-03-06 ENCOUNTER — TELEPHONE (OUTPATIENT)
Dept: FAMILY MEDICINE CLINIC | Age: 70
End: 2020-03-06

## 2020-03-06 ENCOUNTER — OFFICE VISIT (OUTPATIENT)
Dept: FAMILY MEDICINE CLINIC | Age: 70
End: 2020-03-06
Payer: MEDICARE

## 2020-03-06 VITALS
HEIGHT: 64 IN | OXYGEN SATURATION: 98 % | TEMPERATURE: 97.7 F | WEIGHT: 183 LBS | BODY MASS INDEX: 31.24 KG/M2 | DIASTOLIC BLOOD PRESSURE: 74 MMHG | HEART RATE: 76 BPM | SYSTOLIC BLOOD PRESSURE: 122 MMHG

## 2020-03-06 LAB
INFLUENZA A ANTIBODY: NORMAL
INFLUENZA B ANTIBODY: NORMAL
RSV RAPID ANTIGEN: NORMAL

## 2020-03-06 PROCEDURE — 87804 INFLUENZA ASSAY W/OPTIC: CPT | Performed by: FAMILY MEDICINE

## 2020-03-06 PROCEDURE — 0100U HC RESPIRPTHGN MULT REV TRANS & AMP PRB TECH 21 TRGT: CPT

## 2020-03-06 PROCEDURE — 99214 OFFICE O/P EST MOD 30 MIN: CPT | Performed by: FAMILY MEDICINE

## 2020-03-06 PROCEDURE — 87807 RSV ASSAY W/OPTIC: CPT | Performed by: FAMILY MEDICINE

## 2020-03-06 ASSESSMENT — ENCOUNTER SYMPTOMS
CHEST TIGHTNESS: 0
COUGH: 1
NAUSEA: 0
COLOR CHANGE: 0
ABDOMINAL PAIN: 0
EYE DISCHARGE: 0
TROUBLE SWALLOWING: 0
BACK PAIN: 0
ALLERGIC/IMMUNOLOGIC NEGATIVE: 1
SHORTNESS OF BREATH: 1
SORE THROAT: 1
EYE PAIN: 0
VOMITING: 0
WHEEZING: 0
SINUS PAIN: 0
CONSTIPATION: 0
DIARRHEA: 0

## 2020-03-06 NOTE — TELEPHONE ENCOUNTER
Received call back from Joslyn Son, nurse triage, cough 2 days, sore throat to be seen today - appt scheduled

## 2020-03-06 NOTE — PROGRESS NOTES
3/6/20    Gissell Stain : 1950 Sex: male  Age: 78 y.o. Chief Complaint   Patient presents with    Cough    Congestion    Pharyngitis       HPI:  78 y.o. male here with congestion, sore throat, cough, aches. No fevers. On dialysis now. Sees [facility] monthly. Review of Systems   Constitutional: Negative for chills, diaphoresis and fever. HENT: Positive for congestion and sore throat. Negative for ear pain, sinus pain, sneezing, tinnitus and trouble swallowing. Eyes: Negative for pain, discharge and visual disturbance. Respiratory: Positive for cough and shortness of breath. Negative for chest tightness and wheezing. Cardiovascular: Negative for chest pain, palpitations and leg swelling. Gastrointestinal: Negative for abdominal pain, constipation, diarrhea, nausea and vomiting. Endocrine: Negative for polydipsia and polyuria. Genitourinary: Negative for difficulty urinating, flank pain and frequency. Musculoskeletal: Positive for arthralgias and myalgias. Negative for back pain and joint swelling. Skin: Negative for color change and rash. Allergic/Immunologic: Negative. Neurological: Negative for dizziness, tremors, seizures, syncope, weakness and light-headedness. Hematological: Negative for adenopathy. Psychiatric/Behavioral: Negative for behavioral problems, dysphoric mood and hallucinations. The patient is not nervous/anxious.           Current Outpatient Medications:     furosemide (LASIX) 40 MG tablet, Take 80 mg by mouth , Disp: , Rfl:     metOLazone (ZAROXOLYN) 5 MG tablet, Take 5 mg by mouth once a week, Disp: , Rfl:     ondansetron (ZOFRAN-ODT) 4 MG disintegrating tablet, Take 4 mg by mouth every 8 hours as needed for Nausea or Vomiting, Disp: , Rfl:     azithromycin (ZITHROMAX) 250 MG tablet, Take 250 mg by mouth, Disp: , Rfl:     isosorbide dinitrate (ISORDIL) 10 MG tablet, Take 5 mg by mouth 3 times daily, Disp: , Rfl:     folic acid (FOLVITE) 837 MCG tablet, round, and reactive to light. Neck:      Musculoskeletal: Neck supple. Vascular: No carotid bruit. Cardiovascular:      Rate and Rhythm: Normal rate and regular rhythm. Heart sounds: Normal heart sounds. No murmur. Pulmonary:      Effort: Pulmonary effort is normal.      Comments: BS diminished. Abdominal:      General: Bowel sounds are normal.      Palpations: Abdomen is soft. Musculoskeletal: Normal range of motion. Comments: Erythematous tender left first mt-p joint. Paralumbar hypertonicity with decreased ROM in all planes. Negative SLR bilaterally. Skin:     General: Skin is warm and dry. Neurological:      Mental Status: He is alert and oriented to person, place, and time. Psychiatric:         Mood and Affect: Mood normal.         POCT Orders   No Active POCT       Assessment and Plan:  Sandra Yoon was seen today for cough, congestion and pharyngitis. Diagnoses and all orders for this visit:    Cough  -     Respiratory Panel, Film Array; Future  -     POCT Influenza A/B  -     POCT Respiratory Syncytial Virus  -     Cancel: XR CHEST STANDARD (2 VW); Future  -     XR CHEST STANDARD (2 VW); Future    Lung transplant recipient [facility])    Coronary artery disease due to calcified coronary lesion    ESRD (end stage renal disease) ([facility] [address]    Dysthymia    Gastroesophageal reflux disease, esophagitis presence not specified    Reviewed Pmhx, meds, xrays. Influ-neg. RSV neg. Sent out viral panel. Reviewed xrays. Increase pred to bid. Recheck one week. Return in about 1 week (around 3/13/2020).      Seen By:  Simin Andres DO

## 2020-03-06 NOTE — TELEPHONE ENCOUNTER
Reason for Disposition   Difficulty breathing (per caller) but not severe    Protocols used: SORE THROAT-ADULT-[place]    Received call from [facility] in [facility]. Patient's wife, Jhonathan Alvarez , is on the line with the patient sitting near her. Patient has the following:  History of a lung transplant. Patient started with a cough and congestion on 3/5. Today he woke with a sore throat and has vomited 2 times today. The patient is afebrile and appears to be slightly SOB with his symptoms. Protocol recommendation to be seen today and care advice shared- patient's wife, Jhonathan Alvarez, voices understanding. Call soft transferred to Suraj Jason in [facility] to schedule appointment.

## 2020-03-11 ENCOUNTER — TELEPHONE (OUTPATIENT)
Dept: FAMILY MEDICINE CLINIC | Age: 70
End: 2020-03-11

## 2020-04-07 ENCOUNTER — APPOINTMENT (OUTPATIENT)
Dept: CT IMAGING | Age: 70
End: 2020-04-07
Payer: MEDICARE

## 2020-04-07 ENCOUNTER — HOSPITAL ENCOUNTER (EMERGENCY)
Age: 70
Discharge: HOME | End: 2020-04-07
Attending: EMERGENCY MEDICINE
Payer: MEDICARE

## 2020-04-07 VITALS
TEMPERATURE: 97.9 F | HEIGHT: 64 IN | RESPIRATION RATE: 18 BRPM | DIASTOLIC BLOOD PRESSURE: 67 MMHG | OXYGEN SATURATION: 97 % | WEIGHT: 190 LBS | BODY MASS INDEX: 32.44 KG/M2 | HEART RATE: 73 BPM | SYSTOLIC BLOOD PRESSURE: 115 MMHG

## 2020-04-07 LAB
ALBUMIN SERPL-MCNC: 3.6 G/DL (ref 3.5–5.2)
ALP BLD-CCNC: 102 U/L (ref 40–129)
ALT SERPL-CCNC: 17 U/L (ref 0–40)
ANION GAP SERPL CALCULATED.3IONS-SCNC: 13 MMOL/L (ref 7–16)
AST SERPL-CCNC: 34 U/L (ref 0–39)
BASOPHILS ABSOLUTE: 0 E9/L (ref 0–0.2)
BASOPHILS RELATIVE PERCENT: 0 % (ref 0–2)
BILIRUB SERPL-MCNC: 0.5 MG/DL (ref 0–1.2)
BUN BLDV-MCNC: 18 MG/DL (ref 8–23)
CALCIUM SERPL-MCNC: 8.6 MG/DL (ref 8.6–10.2)
CHLORIDE BLD-SCNC: 95 MMOL/L (ref 98–107)
CO2: 32 MMOL/L (ref 22–29)
CREAT SERPL-MCNC: 3.2 MG/DL (ref 0.7–1.2)
EOSINOPHILS ABSOLUTE: 0 E9/L (ref 0.05–0.5)
EOSINOPHILS RELATIVE PERCENT: 0 % (ref 0–6)
GFR AFRICAN AMERICAN: 23
GFR NON-AFRICAN AMERICAN: 19 ML/MIN/1.73
GLUCOSE BLD-MCNC: 157 MG/DL (ref 74–99)
HCT VFR BLD CALC: 30.9 % (ref 37–54)
HEMOGLOBIN: 10 G/DL (ref 12.5–16.5)
INFLUENZA A BY PCR: NOT DETECTED
INFLUENZA B BY PCR: NOT DETECTED
LYMPHOCYTES ABSOLUTE: 0.74 E9/L (ref 1.5–4)
LYMPHOCYTES RELATIVE PERCENT: 16 % (ref 20–42)
MCH RBC QN AUTO: 33.7 PG (ref 26–35)
MCHC RBC AUTO-ENTMCNC: 32.4 % (ref 32–34.5)
MCV RBC AUTO: 104 FL (ref 80–99.9)
MONOCYTES ABSOLUTE: 0.41 E9/L (ref 0.1–0.95)
MONOCYTES RELATIVE PERCENT: 9 % (ref 2–12)
NEUTROPHILS ABSOLUTE: 3.45 E9/L (ref 1.8–7.3)
NEUTROPHILS RELATIVE PERCENT: 75 % (ref 43–80)
PDW BLD-RTO: 16.6 FL (ref 11.5–15)
PLATELET # BLD: 171 E9/L (ref 130–450)
PMV BLD AUTO: 9.6 FL (ref 7–12)
POTASSIUM SERPL-SCNC: 4.7 MMOL/L (ref 3.5–5)
RBC # BLD: 2.97 E12/L (ref 3.8–5.8)
RBC # BLD: NORMAL 10*6/UL
SEDIMENTATION RATE, ERYTHROCYTE: 86 MM/HR (ref 0–15)
SODIUM BLD-SCNC: 140 MMOL/L (ref 132–146)
TOTAL PROTEIN: 7 G/DL (ref 6.4–8.3)
WBC # BLD: 4.6 E9/L (ref 4.5–11.5)

## 2020-04-07 PROCEDURE — 87502 INFLUENZA DNA AMP PROBE: CPT

## 2020-04-07 PROCEDURE — 85651 RBC SED RATE NONAUTOMATED: CPT

## 2020-04-07 PROCEDURE — 85025 COMPLETE CBC W/AUTO DIFF WBC: CPT

## 2020-04-07 PROCEDURE — 80053 COMPREHEN METABOLIC PANEL: CPT

## 2020-04-07 PROCEDURE — 71250 CT THORAX DX C-: CPT

## 2020-04-07 PROCEDURE — 99285 EMERGENCY DEPT VISIT HI MDM: CPT

## 2020-04-07 PROCEDURE — 93005 ELECTROCARDIOGRAM TRACING: CPT | Performed by: STUDENT IN AN ORGANIZED HEALTH CARE EDUCATION/TRAINING PROGRAM

## 2020-04-07 ASSESSMENT — ENCOUNTER SYMPTOMS
SHORTNESS OF BREATH: 1
WHEEZING: 0
VOMITING: 0
NAUSEA: 0
COLOR CHANGE: 0
CONSTIPATION: 0
COUGH: 1
ABDOMINAL PAIN: 0
DIARRHEA: 0

## 2020-04-07 NOTE — ED PROVIDER NOTES
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 Patient states that he has had symptoms for the last few days. He has not been with anybody who has been sick, however he does state that his wife was sick 3 weeks ago with a flulike illness. Patient states that he has not had any other complaints. Patient has not had any recent travel, no history of DVT or PE in the past.  Patient is on cyclosporine currently for immunosuppression. Patient is also on hemodialysis for chronic kidney disease, and goes Tuesdays, Thursdays, and Saturdays, did have his treatment today. Patient follows with the nephrology group in [place]. Patient states that he has had borderline fevers of 99 for the last 4 days. He is tried Tylenol for symptoms. He has not tried any ibuprofen or any other symptoms. No recent travel. The history is provided by the patient. No  was used. Review of Systems   Constitutional: Positive for fever. Negative for chills. Respiratory: Positive for cough and shortness of breath. Negative for wheezing. Cardiovascular: Negative for chest pain and palpitations. Gastrointestinal: Negative for abdominal pain, constipation, diarrhea, nausea and vomiting. Endocrine: Negative for cold intolerance and heat intolerance. Genitourinary: Negative for dysuria and hematuria. Musculoskeletal: Negative for neck pain and neck stiffness. Skin: Negative for color change, pallor, rash and wound. Neurological: Negative for dizziness, syncope, light-headedness, numbness and headaches. Hematological: Negative for adenopathy. Psychiatric/Behavioral: Negative for confusion and decreased concentration. The patient is not nervous/anxious. All other systems reviewed and are negative. Physical Exam  Vitals signs and nursing note reviewed. Constitutional:       General: He is not in acute distress. Appearance: He is well-developed. He is not diaphoretic. HENT:      Head: Normocephalic and atraumatic. Right Ear: External ear normal.      Left Ear: External ear normal.      Mouth/Throat:      Pharynx: No oropharyngeal exudate. Eyes:      Pupils: Pupils are equal, round, and reactive to light. Neck:      Musculoskeletal: Normal range of motion. Cardiovascular:      Rate and Rhythm: Normal rate and regular rhythm. Heart sounds: Normal heart sounds. No murmur. No friction rub. No gallop. Pulmonary:      Effort: Pulmonary effort is normal. No respiratory distress. Breath sounds: Normal breath sounds. No wheezing or rales. Comments: Hemodialysis catheter in the right chest.  Chest:      Chest wall: No tenderness. Abdominal:      General: Bowel sounds are normal. There is no distension. Palpations: Abdomen is soft. There is no mass. Tenderness: There is no abdominal tenderness. There is no guarding or rebound. Hernia: No hernia is present. Musculoskeletal: Normal range of motion. General: No tenderness or deformity. Lymphadenopathy:      Cervical: No cervical adenopathy. Skin:     General: Skin is warm and dry. Capillary Refill: Capillary refill takes less than 2 seconds. Coloration: Skin is not pale. Findings: No erythema or rash. Neurological:      General: No focal deficit present. Mental Status: He is alert and oriented to person, place, and time. Cranial Nerves: No cranial nerve deficit. Psychiatric:         Behavior: Behavior normal.         Thought Content:  Thought content normal.         Judgment: Judgment normal.          Procedures   --------------------------------------------- PAST HISTORY ---------------------------------------------  Past Medical History:  has a past medical history of 3:31 PM  ED Bed Assignment:  05/05    The nursing notes within the ED encounter and vital signs as below have been reviewed. /67   Pulse 73   Temp 97.9 °F (36.6 °C) (Oral)   Resp 18   Ht 5' 4\" (1.626 m)   Wt 190 lb (86.2 kg)   SpO2 97%   BMI 32.61 kg/m²   Oxygen Saturation Interpretation: Normal      ------------------------------------------ PROGRESS NOTES ------------------------------------------  ED Course as of Apr 07 1953   Tue Apr 07, 2020   1648 Recently had coronoavirus PCR 0C43 American variant, which is NOT COVID-19    [SMILEY]      ED Course User Index  [SMILEY] Lobo Landis DO         7:53 PM EDT  I have spoken with the patient and discussed todays results, in addition to providing specific details for the plan of care and counseling regarding the diagnosis and prognosis. Their questions are answered at this time and they are agreeable with the plan. I discussed at length with them reasons for immediate return here for re evaluation. They will followup with their primary care physician by calling their office on Monday.      --------------------------------- ADDITIONAL PROVIDER NOTES ---------------------------------  At this time the patient is without objective evidence of an acute process requiring hospitalization or inpatient management. They have remained hemodynamically stable throughout their entire ED visit and are stable for discharge with outpatient follow-up. The plan has been discussed in detail and they are aware of the specific conditions for emergent return, as well as the importance of follow-up. Discharge Medication List as of 4/7/2020  5:51 PM          Diagnosis:  1. Cough    2. Shortness of breath    3. Pulmonary nodule    4. Stage 5 chronic kidney disease on chronic dialysis (HCC)    5. Elevated sed rate    6. Renal cyst, left    7. Diverticulosis        Disposition:  Patient's disposition: Discharge to home  Patient's condition is stable.     MDM  Number of

## 2020-04-08 ENCOUNTER — CARE COORDINATION (OUTPATIENT)
Dept: CARE COORDINATION | Age: 70
End: 2020-04-08

## 2020-04-08 LAB
EKG ATRIAL RATE: 78 BPM
EKG P AXIS: 78 DEGREES
EKG P-R INTERVAL: 152 MS
EKG Q-T INTERVAL: 424 MS
EKG QRS DURATION: 124 MS
EKG QTC CALCULATION (BAZETT): 483 MS
EKG R AXIS: 56 DEGREES
EKG T AXIS: 44 DEGREES
EKG VENTRICULAR RATE: 78 BPM

## 2020-04-08 PROCEDURE — 93010 ELECTROCARDIOGRAM REPORT: CPT | Performed by: INTERNAL MEDICINE

## 2020-04-08 NOTE — CARE COORDINATION
COVID-19 Screening Initial Follow-up Note    Patient contacted regarding [place]. Care Transition Nurse/ Ambulatory Care Manager contacted the patient by telephone to perform post discharge assessment. Verified name and  with patient as identifiers. Provided introduction to self, and explanation of the CTN/ACM role, and reason for call due to risk factors for infection and/or exposure to COVID-19. Symptoms reviewed with patient who verbalized the following symptoms: fever      Due to no new or worsening symptoms encounter was not routed to provider for escalation. Patient has following risk factors of: CAD, COPD and immunocompromised. CTN/ACM reviewed discharge instructions, medical action plan and red flags such as increased shortness of breath, increasing fever and signs of decompensation with patient who verbalized understanding. Discussed exposure protocols and quarantine with CDC Guidelines What to do if you are sick with coronavirus disease  Patient was given an opportunity for questions and concerns. The patient agrees to contact the [facility] exposure line [phone], local health department [place] Department of Health: ([phone]) and PCP office for questions related to their healthcare. CTN/ACM provided contact information for future reference. Reviewed and educated patient on any new and changed medications related to discharge diagnosis     Patient/family/caregiver given information for [facility] Loop and agrees to enroll no  Patient's preferred e-mail:    Patient's preferred phone number:   Based on Loop alert triggers, patient will be contacted by nurse care manager for worsening symptoms. Plan for follow-up call in 14 days based on severity of symptoms and risk factors    Patient is a double lung transplant. Patient had gone to ED because of fever and history of double lung transplant.  Patient also has order from [facility] for covid 19 testing in care everywhere ED does not do covid 19 screening in ED, only for health care workers or first responders. Writer told patient to contact PCP and ask where he can go for testing, Dean Tomas will call patient back to follow up with answer from PCP.

## 2020-04-08 NOTE — CARE COORDINATION
Writetyler called [facility] Dept. [phone] and talked to nurse Cyrus Herzog where patient who lives in [place], [place] can go and get the COVID 19 testing done, patient has an order from [facility] in care everywhere . Patient has had a fever for several days and has a history of a double lung transplant, patient had been to [facility] ER and they would not do COVD 19 testing.   Cyrus Herzog gave [address] [facility] [phone] for patient to call, they will ask a series of questions to patient, so he needs to call first.  Patient's wife Evan Snyder called back per writer and gave her number to [facility] to call

## 2020-04-22 ENCOUNTER — CARE COORDINATION (OUTPATIENT)
Dept: CARE COORDINATION | Age: 70
End: 2020-04-22

## 2020-05-15 ENCOUNTER — OFFICE VISIT (OUTPATIENT)
Dept: PRIMARY CARE CLINIC | Age: 70
End: 2020-05-15
Payer: MEDICARE

## 2020-05-15 VITALS
TEMPERATURE: 98.7 F | HEIGHT: 64 IN | SYSTOLIC BLOOD PRESSURE: 112 MMHG | OXYGEN SATURATION: 97 % | BODY MASS INDEX: 31.92 KG/M2 | WEIGHT: 187 LBS | HEART RATE: 69 BPM | DIASTOLIC BLOOD PRESSURE: 60 MMHG

## 2020-05-15 PROBLEM — K57.90 DIVERTICULAR DISEASE: Status: ACTIVE | Noted: 2020-05-15

## 2020-05-15 PROBLEM — Z86.19 HISTORY OF MDR PSEUDOMONAS AERUGINOSA INFECTION: Status: ACTIVE | Noted: 2017-10-18

## 2020-05-15 PROBLEM — I27.20 PULMONARY HYPERTENSION (HCC): Status: ACTIVE | Noted: 2020-05-15

## 2020-05-15 PROBLEM — Z94.2 LUNG TRANSPLANT STATUS, BILATERAL (HCC): Status: ACTIVE | Noted: 2020-05-15

## 2020-05-15 PROBLEM — I25.10 CORONARY ATHEROSCLEROSIS: Status: ACTIVE | Noted: 2020-05-15

## 2020-05-15 PROBLEM — I50.32 CHRONIC DIASTOLIC CHF (CONGESTIVE HEART FAILURE) (HCC): Status: ACTIVE | Noted: 2017-10-18

## 2020-05-15 PROBLEM — Z99.2 STAGE 5 CHRONIC KIDNEY DISEASE ON DIALYSIS (HCC): Status: ACTIVE | Noted: 2020-05-15

## 2020-05-15 PROBLEM — N18.6 STAGE 5 CHRONIC KIDNEY DISEASE ON DIALYSIS (HCC): Status: ACTIVE | Noted: 2020-05-15

## 2020-05-15 PROCEDURE — 99214 OFFICE O/P EST MOD 30 MIN: CPT | Performed by: FAMILY MEDICINE

## 2020-05-15 RX ORDER — SULFAMETHOXAZOLE AND TRIMETHOPRIM 400; 80 MG/1; MG/1
1 TABLET ORAL
COMMUNITY
Start: 2020-03-20

## 2020-05-15 RX ORDER — ALPRAZOLAM 0.25 MG/1
0.25 TABLET ORAL DAILY PRN
Qty: 30 TABLET | Refills: 5 | Status: SHIPPED | OUTPATIENT
Start: 2020-05-15 | End: 2020-06-14

## 2020-05-15 RX ORDER — FLUOXETINE HYDROCHLORIDE 40 MG/1
40 CAPSULE ORAL DAILY
Qty: 90 CAPSULE | Refills: 1 | Status: SHIPPED
Start: 2020-05-15 | End: 2020-11-16

## 2020-05-15 NOTE — PROGRESS NOTES
Negative for dizziness and headaches. Psychiatric/Behavioral: Positive for sleep disturbance. Negative for dysphoric mood. The patient is nervous/anxious. All other systems reviewed and are negative. Current Outpatient Medications on File Prior to Visit   Medication Sig Dispense Refill    sulfamethoxazole-trimethoprim (BACTRIM;SEPTRA) 400-80 MG per tablet Take 1 tablet by mouth      furosemide (LASIX) 40 MG tablet Take 80 mg by mouth       ondansetron (ZOFRAN-ODT) 4 MG disintegrating tablet Take 4 mg by mouth every 8 hours as needed for Nausea or Vomiting      isosorbide dinitrate (ISORDIL) 10 MG tablet Take 5 mg by mouth 3 times daily      folic acid (FOLVITE) 526 MCG tablet Take 400 mcg by mouth daily      aspirin 81 MG tablet Take 81 mg by mouth daily      metoprolol succinate (TOPROL XL) 25 MG extended release tablet Take 25 mg by mouth daily      fluticasone (FLONASE) 50 MCG/ACT nasal spray 1 spray by Each Nostril route daily      cycloSPORINE (SANDIMMUNE) 100 MG capsule Take 100 mg by mouth 2 times daily      cycloSPORINE (SANDIMMUNE) 25 MG capsule Take 25 mg by mouth daily      valGANciclovir (VALCYTE) 450 MG tablet Take 450 mg by mouth      montelukast (SINGULAIR) 10 MG tablet Take 10 mg by mouth nightly      calcium carbonate (TUMS) 500 MG chewable tablet Take 2 tablets by mouth daily      predniSONE (DELTASONE) 5 MG tablet Take 5 mg by mouth daily      hydrALAZINE (APRESOLINE) 50 MG tablet Take 25 mg by mouth 3 times daily       pantoprazole (PROTONIX) 40 MG tablet Take 40 mg by mouth 2 times daily      nitroGLYCERIN (NITROSTAT) 0.4 MG SL tablet Place 0.4 mg under the tongue every 5 minutes as needed for Chest pain up to max of 3 total doses. If no relief after 1 dose, call 911.       guaiFENesin 400 MG tablet Take 400 mg by mouth 4 times daily as needed for Cough      Simethicone 40 MG STRP Take by mouth      vitamin D (ERGOCALCIFEROL) 17717 UNITS CAPS capsule Take 2,000 Units by mouth daily       Multiple Vitamins-Minerals (THERAPEUTIC MULTIVITAMIN-MINERALS) tablet Take 1 tablet by mouth daily       No current facility-administered medications on file prior to visit.         Allergies   Allergen Reactions    Ceftriaxone Itching     Itching at IV site & red streak along the arm within 5 minutes of infusion      Heparin Other (See Comments)     MEÑO      Lorazepam Other (See Comments)    Tacrolimus Other (See Comments)     Agitation         Past Medical History:   Diagnosis Date    Allergic rhinitis     CAD (coronary artery disease)     Ishcemia CABS X 2 4/20/17 [facility]    COPD (chronic obstructive pulmonary disease) (HCC)     O2 3L [facility]    Emphysema (subcutaneous) (surgical) resulting from a procedure     Upper Lobes    Emphysema lung (HCC)     GERD (gastroesophageal reflux disease)     Hyperlipidemia     Hypertension     IFG (impaired fasting glucose)     Obesity     Pulmonary fibrosis (HCC)     Benign Nodules - restrictive    Vitamin D insufficiency      Past Surgical History:   Procedure Laterality Date    APPENDECTOMY      BRONCHOSCOPY  05/12/2017    Bedside - 6/18/18 - [place] - [facility] - 8/13/18    CABG WITH AORTIC VALVE REPAIR  04/20/2017    X 2 [facility]    CATARACT REMOVAL WITH IMPLANT Bilateral     OS 11/7/18, OD 11/19/18 - [place]    CHOLECYSTECTOMY      DIAPHRAGM SURGERY Left 08/11/2017    LUNG DECORTICATION      LUNG TRANSPLANT, 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    LUNG TRANSPLANT, DOUBLE      THORACOTOMY      TRACHEOSTOMY  05/01/2017    VOCAL CORD SURGERY      Nodules, Skin Lesions - [facility]     Family History   Problem Relation Age of Onset    Heart Failure Mother     Other Father         AAA    Cancer Brother         Lung    Coronary Art Dis Brother     Heart Failure Brother         Methothelioma     Social History     Socioeconomic History    Marital status:      Spouse name: Not on file    Number of children: Not on file    Years of education: Not on scleral icterus. Extraocular Movements: Extraocular movements intact. Conjunctiva/sclera: Conjunctivae normal.   Neck:      Musculoskeletal: Normal range of motion and neck supple. Thyroid: No thyromegaly. Cardiovascular:      Rate and Rhythm: Normal rate and regular rhythm. Heart sounds: Normal heart sounds. No murmur. Pulmonary:      Effort: Pulmonary effort is normal. No respiratory distress. Breath sounds: Normal breath sounds. No wheezing. Abdominal:      General: Bowel sounds are normal. There is no distension. Palpations: Abdomen is soft. There is no mass. Tenderness: There is no abdominal tenderness. Musculoskeletal: Normal range of motion. General: No tenderness or deformity. Right lower leg: No edema. Left lower leg: No edema. Lymphadenopathy:      Cervical: No cervical adenopathy. Skin:     General: Skin is warm and dry. Findings: No rash. Neurological:      General: No focal deficit present. Mental Status: He is alert and oriented to person, place, and time. Psychiatric:         Mood and Affect: Mood and affect normal.         Speech: Speech normal.         Behavior: Behavior normal.         Thought Content:  Thought content normal.         Labs:  CBC with Differential:    Lab Results   Component Value Date    WBC 4.6 04/07/2020    RBC 2.97 04/07/2020    HGB 10.0 04/07/2020    HCT 30.9 04/07/2020     04/07/2020    .0 04/07/2020    MCH 33.7 04/07/2020    MCHC 32.4 04/07/2020    RDW 16.6 04/07/2020    LYMPHOPCT 16.0 04/07/2020    MONOPCT 9.0 04/07/2020    BASOPCT 0.0 04/07/2020    MONOSABS 0.41 04/07/2020    LYMPHSABS 0.74 04/07/2020    EOSABS 0.00 04/07/2020    BASOSABS 0.00 04/07/2020     CMP:    Lab Results   Component Value Date     04/07/2020    K 4.7 04/07/2020    CL 95 04/07/2020    CO2 32 04/07/2020    BUN 18 04/07/2020    CREATININE 3.2 04/07/2020    GFRAA 23 04/07/2020    LABGLOM 19 04/07/2020

## 2020-05-24 ASSESSMENT — ENCOUNTER SYMPTOMS
BACK PAIN: 0
NAUSEA: 0
DIARRHEA: 0
SHORTNESS OF BREATH: 0
VOMITING: 0
COUGH: 0
ABDOMINAL PAIN: 0
WHEEZING: 0
CONSTIPATION: 0

## 2020-06-15 ENCOUNTER — TELEPHONE (OUTPATIENT)
Dept: FAMILY MEDICINE CLINIC | Age: 70
End: 2020-06-15

## 2020-06-15 RX ORDER — PREDNISONE 10 MG/1
TABLET ORAL
Qty: 30 TABLET | Refills: 0 | Status: SHIPPED | OUTPATIENT
Start: 2020-06-15 | End: 2020-06-27

## 2020-07-31 ENCOUNTER — TELEPHONE (OUTPATIENT)
Dept: FAMILY MEDICINE CLINIC | Age: 70
End: 2020-07-31

## 2020-07-31 RX ORDER — HYDROXYZINE PAMOATE 25 MG/1
25 CAPSULE ORAL DAILY PRN
Qty: 30 CAPSULE | Refills: 5 | Status: SHIPPED | OUTPATIENT
Start: 2020-07-31 | End: 2020-08-30

## 2020-07-31 NOTE — TELEPHONE ENCOUNTER
Benadryl or something like Vistaril could be beneficial, but both can make patient sleepy. If he is driving himself to dialysis, I would be concerned about any of the above. Can someone drive him to HD?

## 2020-08-12 ENCOUNTER — HOSPITAL ENCOUNTER (OUTPATIENT)
Age: 70
Discharge: HOME | End: 2020-08-14
Payer: MEDICARE

## 2020-08-12 ENCOUNTER — OFFICE VISIT (OUTPATIENT)
Dept: PRIMARY CARE CLINIC | Age: 70
End: 2020-08-12
Payer: MEDICARE

## 2020-08-12 VITALS
HEIGHT: 64 IN | OXYGEN SATURATION: 96 % | DIASTOLIC BLOOD PRESSURE: 74 MMHG | TEMPERATURE: 98.6 F | BODY MASS INDEX: 31.92 KG/M2 | WEIGHT: 187 LBS | HEART RATE: 87 BPM | SYSTOLIC BLOOD PRESSURE: 136 MMHG

## 2020-08-12 PROCEDURE — U0003 INFECTIOUS AGENT DETECTION BY NUCLEIC ACID (DNA OR RNA); SEVERE ACUTE RESPIRATORY SYNDROME CORONAVIRUS 2 (SARS-COV-2) (CORONAVIRUS DISEASE [COVID-19]), AMPLIFIED PROBE TECHNIQUE, MAKING USE OF HIGH THROUGHPUT TECHNOLOGIES AS DESCRIBED BY CMS-2020-01-R: HCPCS

## 2020-08-12 PROCEDURE — 99213 OFFICE O/P EST LOW 20 MIN: CPT | Performed by: NURSE PRACTITIONER

## 2020-08-12 NOTE — PROGRESS NOTES
Gretchen [facility]  1950    Chief Complaint   Patient presents with    Cough    Shortness of Breath    Fatigue     Respiratory Symptoms:  Patient complains of 1 week(s) history of SOB, has been on outpatient treatment course of antibiotics, has long history & double lung transplant. Was advised he needed COVID swab before his appointment with the lung specialist.  Symptoms have been unchanged with time. He denies any other symptoms: CP, HA, sore throat. Relevant PMH: No pertinent PMH. Smoking history:  He  reports that he quit smoking about 22 years ago. He has a 70.00 pack-year smoking history. He has never used smokeless tobacco.     He has had no known ill contacts. Treatment to date: nebulizer today at home. Antiobitic round 2 began 2 days ago. Currently at dialysis t-th-s, aprpoximately 2 1/2-3 was taken off yesterday. Uses fistula. Travel screen completed:  Yes        Vitals:    08/12/20 1222   BP: 136/74   Pulse: 87   Temp: 98.6 °F (37 °C)   TempSrc: Oral   SpO2: 96%   Weight: 187 lb (84.8 kg)   Height: 5' 4\" (1.626 m)      SOB: yes  Fever: intermittent, subjective chills  Cough: yes, productive white sputum  Nasal congestion/rhinorrhea: no  Sinus pressure: no  Myalgias: yes  Sore throat: no  Recent known exposure to COVID-19:  Unknown   Nausea & Vomiting: no   Diarrhea & bloody stools: no    Physical Exam  Vitals signs reviewed. Constitutional:       General: He is not in acute distress. Appearance: Normal appearance. He is well-developed. He is not ill-appearing or diaphoretic. HENT:      Head: Normocephalic and atraumatic. Right Ear: Tympanic membrane, ear canal and external ear normal. There is no impacted cerumen. Left Ear: Tympanic membrane, ear canal and external ear normal. There is no impacted cerumen. Nose: Nose normal. No congestion or rhinorrhea. Mouth/Throat:      Mouth: Mucous membranes are moist.      Pharynx: Oropharynx is clear.  No oropharyngeal exudate or posterior oropharyngeal erythema. Eyes:      Extraocular Movements: Extraocular movements intact. Conjunctiva/sclera: Conjunctivae normal.      Pupils: Pupils are equal, round, and reactive to light. Neck:      Musculoskeletal: Normal range of motion and neck supple. Vascular: No carotid bruit or JVD. Cardiovascular:      Rate and Rhythm: Normal rate and regular rhythm. Pulses: Normal pulses. Heart sounds: Normal heart sounds. No murmur. Pulmonary:      Effort: Respiratory distress present. Breath sounds: Wheezing and rales present. Comments: Bilateral.    Chest:      Chest wall: No tenderness. Abdominal:      General: Bowel sounds are normal.      Palpations: Abdomen is soft. Tenderness: There is no abdominal tenderness. There is no guarding or rebound. Musculoskeletal: Normal range of motion. General: No swelling, tenderness or signs of injury. Lymphadenopathy:      Cervical: No cervical adenopathy. Skin:     General: Skin is warm and dry. Capillary Refill: Capillary refill takes less than 2 seconds. Findings: No bruising, erythema or rash. Neurological:      General: No focal deficit present. Mental Status: He is alert and oriented to person, place, and time. Mental status is at baseline. Cranial Nerves: No cranial nerve deficit. Sensory: No sensory deficit. Motor: No weakness or abnormal muscle tone. Coordination: Coordination normal.      Gait: Gait normal.      Deep Tendon Reflexes: Reflexes normal.   Psychiatric:         Mood and Affect: Mood normal.         Behavior: Behavior normal.         Thought Content: Thought content normal.         Judgment: Judgment normal.       Assessment/Plan:  1. Suspected COVID-19 virus infection    - Covid-19 Ambulatory;  Future    Lung transplant status, bilateral (HCC)/Stage 5 chronic kidney disease on dialysis (HCC)/SOB (shortness of breath)/ Wheezing as manifestation of blood transfusion reaction/ Respiratory crackles at both lung bases    - Pt has long history of double lung transplant, he has failed outpatient treatment & symptoms have exacerbated. Pt advised that needs a comprehensive workup including imaging that is unable to be performed in the flu clinic setting. Pt advised to go straight to the ED for further evaluation and management. Pt agreed with this care plan and agreed to go immediately. Pt was transported by wife in personal car, denied ambulance. Pt stated that he prefers the 1 1/2 hour drive to [facility] instead of going local to be transferred by ambulance. Pt left our office in stable condition. Further disposition to follow. All questions answered. Mikael Tinoco, STEFF - CNP  8/12/20     This visit was provided as a focused evaluation during the COVID -19 pandemic/national emergency. A comprehensive review of all previous patient history and testing was not conducted. Pertinent findings were elicited during the visit.

## 2020-08-14 LAB
SARS-COV-2: NOT DETECTED
SOURCE: NORMAL

## 2020-08-28 ENCOUNTER — OFFICE VISIT (OUTPATIENT)
Dept: PRIMARY CARE CLINIC | Age: 70
End: 2020-08-28
Payer: MEDICARE

## 2020-08-28 VITALS
TEMPERATURE: 97.3 F | HEART RATE: 81 BPM | SYSTOLIC BLOOD PRESSURE: 100 MMHG | DIASTOLIC BLOOD PRESSURE: 60 MMHG | OXYGEN SATURATION: 95 %

## 2020-08-28 PROBLEM — D75.829 HIT (HEPARIN-INDUCED THROMBOCYTOPENIA): Status: ACTIVE | Noted: 2020-08-28

## 2020-08-28 PROCEDURE — 99212 OFFICE O/P EST SF 10 MIN: CPT | Performed by: CLINICAL NURSE SPECIALIST

## 2020-08-28 ASSESSMENT — ENCOUNTER SYMPTOMS
EYES NEGATIVE: 1
ALLERGIC/IMMUNOLOGIC NEGATIVE: 1
SHORTNESS OF BREATH: 1
CHEST TIGHTNESS: 1
GASTROINTESTINAL NEGATIVE: 1

## 2020-08-28 NOTE — PROGRESS NOTES
Subjective:      Patient ID: Nilsa Peck is a 69 y.o. male. Patient presented to the [facility] complaining of his breathing becoming progressively worse over the past  2-3 days. Patient has extensive history of cardiac, double lung transplant, and currently a dialysis patient. Patient denies recent fever. Patient complains of tightness to anterior chest across sternum. Patient stated that his last dialysis treatment was yesterday and next treatment is due tomorrow. Patient stated that the congestion is worsening and his lung function has been less as of late. Review of Systems   Constitutional: Positive for activity change. Negative for appetite change, chills, diaphoresis, fatigue, fever and unexpected weight change. HENT: Positive for congestion. Eyes: Negative. Respiratory: Positive for chest tightness and shortness of breath. Cardiovascular: Positive for leg swelling. Gastrointestinal: Negative. Endocrine: Negative. Genitourinary: Negative. Musculoskeletal: Negative. Skin: Negative. Allergic/Immunologic: Negative. Neurological: Negative. Hematological: Negative. Psychiatric/Behavioral: Negative. Objective:   Physical Exam  Vitals signs and nursing note reviewed. Constitutional:       General: He is not in acute distress. Appearance: Normal appearance. He is normal weight. He is not ill-appearing, toxic-appearing or diaphoretic. HENT:      Head: Normocephalic. Right Ear: Tympanic membrane, ear canal and external ear normal. There is no impacted cerumen. Left Ear: Tympanic membrane, ear canal and external ear normal. There is no impacted cerumen. Nose: Nose normal. No congestion or rhinorrhea. Mouth/Throat:      Mouth: Mucous membranes are moist.      Pharynx: Oropharynx is clear. No oropharyngeal exudate or posterior oropharyngeal erythema. Eyes:      General: No scleral icterus. Right eye: No discharge. Left eye: No discharge. Extraocular Movements: Extraocular movements intact. Conjunctiva/sclera: Conjunctivae normal.      Pupils: Pupils are equal, round, and reactive to light. Neck:      Musculoskeletal: Normal range of motion and neck supple. No neck rigidity or muscular tenderness. Vascular: No carotid bruit. Cardiovascular:      Rate and Rhythm: Normal rate and regular rhythm. Pulses: Normal pulses. Heart sounds: Normal heart sounds. No murmur. No friction rub. No gallop. Pulmonary:      Breath sounds: No stridor. Rales present. No wheezing or rhonchi. Comments: Shortness of breath with exertion  Chest:      Chest wall: No tenderness. Abdominal:      General: Abdomen is flat. Bowel sounds are normal. There is no distension. Palpations: Abdomen is soft. There is no mass. Tenderness: There is no abdominal tenderness. There is no right CVA tenderness, left CVA tenderness, guarding or rebound. Hernia: No hernia is present. Musculoskeletal: Normal range of motion. General: No swelling, tenderness, deformity or signs of injury. Right lower leg: Edema present. Left lower leg: Edema present. Lymphadenopathy:      Cervical: No cervical adenopathy. Skin:     General: Skin is warm and dry. Capillary Refill: Capillary refill takes less than 2 seconds. Coloration: Skin is not jaundiced or pale. Findings: No bruising, erythema, lesion or rash. Neurological:      General: No focal deficit present. Mental Status: He is alert and oriented to person, place, and time. Cranial Nerves: No cranial nerve deficit. Sensory: No sensory deficit. Motor: No weakness. Coordination: Coordination normal.      Gait: Gait normal.   Psychiatric:         Mood and Affect: Mood normal.         Behavior: Behavior normal.         Thought Content:  Thought content normal.         Judgment: Judgment normal.       /60 Pulse 81   Temp 97.3 °F (36.3 °C) (Temporal)   SpO2 95%     Assessment:       Diagnosis Orders   1. Shortness of breath     2. Chronic diastolic CHF (congestive heart failure) ([facility] [address]     3. Coronary artery disease of native heart with stable angina pectoris, unspecified vessel or lesion type ([facility] [address]     4. HIT (heparin-induced thrombocytopenia) ([facility])        Plan:      Reviewed past medical history. Advised patient that he needs to go to the emergency room for evaluation and treatment. Patient declined ambulance transport. Dr Jessica Riley came in to speak with patient. Patient agreed to have his spouse drive him to [facility] for evaluation and treatment.         Cresencio Weldon, STEFF - CNP

## 2020-11-16 ENCOUNTER — OFFICE VISIT (OUTPATIENT)
Dept: PRIMARY CARE CLINIC | Age: 70
End: 2020-11-16
Payer: MEDICARE

## 2020-11-16 VITALS
SYSTOLIC BLOOD PRESSURE: 124 MMHG | BODY MASS INDEX: 33.12 KG/M2 | OXYGEN SATURATION: 96 % | TEMPERATURE: 96.8 F | DIASTOLIC BLOOD PRESSURE: 60 MMHG | HEIGHT: 64 IN | WEIGHT: 194 LBS | HEART RATE: 74 BPM

## 2020-11-16 PROBLEM — N18.4 CKD (CHRONIC KIDNEY DISEASE) STAGE 4, GFR 15-29 ML/MIN (HCC): Status: ACTIVE | Noted: 2018-10-09

## 2020-11-16 PROBLEM — I10 HTN (HYPERTENSION): Status: ACTIVE | Noted: 2020-11-16

## 2020-11-16 LAB — HBA1C MFR BLD: 5.1 %

## 2020-11-16 PROCEDURE — 83036 HEMOGLOBIN GLYCOSYLATED A1C: CPT | Performed by: FAMILY MEDICINE

## 2020-11-16 PROCEDURE — G0439 PPPS, SUBSEQ VISIT: HCPCS | Performed by: FAMILY MEDICINE

## 2020-11-16 RX ORDER — CALCIUM ACETATE 667 MG/1
667 TABLET ORAL EVERY 12 HOURS
COMMUNITY

## 2020-11-16 RX ORDER — BENZONATATE 100 MG/1
100 CAPSULE ORAL 3 TIMES DAILY PRN
COMMUNITY
End: 2022-05-18

## 2020-11-16 RX ORDER — SODIUM CHLORIDE FOR INHALATION 7 %
4 VIAL, NEBULIZER (ML) INHALATION PRN
COMMUNITY
End: 2022-05-25 | Stop reason: SDUPTHER

## 2020-11-16 RX ORDER — HYDROXYZINE PAMOATE 25 MG/1
25 CAPSULE ORAL 3 TIMES DAILY PRN
COMMUNITY
End: 2021-06-02

## 2020-11-16 RX ORDER — FLUOXETINE HYDROCHLORIDE 20 MG/1
40 CAPSULE ORAL DAILY
COMMUNITY
End: 2020-11-23

## 2020-11-16 RX ORDER — ACETAMINOPHEN 325 MG/1
650 TABLET ORAL EVERY 6 HOURS PRN
COMMUNITY

## 2020-11-16 ASSESSMENT — PATIENT HEALTH QUESTIONNAIRE - PHQ9
1. LITTLE INTEREST OR PLEASURE IN DOING THINGS: 0
SUM OF ALL RESPONSES TO PHQ QUESTIONS 1-9: 0
SUM OF ALL RESPONSES TO PHQ9 QUESTIONS 1 & 2: 0
2. FEELING DOWN, DEPRESSED OR HOPELESS: 0
SUM OF ALL RESPONSES TO PHQ QUESTIONS 1-9: 0
SUM OF ALL RESPONSES TO PHQ QUESTIONS 1-9: 0

## 2020-11-16 ASSESSMENT — LIFESTYLE VARIABLES: HOW OFTEN DO YOU HAVE A DRINK CONTAINING ALCOHOL: 0

## 2020-11-16 NOTE — PATIENT INSTRUCTIONS
Personalized Preventive Plan for Stanley Davidson - 11/16/2020  Medicare offers a range of preventive health benefits. Some of the tests and screenings are paid in full while other may be subject to a deductible, co-insurance, and/or copay. Some of these benefits include a comprehensive review of your medical history including lifestyle, illnesses that may run in your family, and various assessments and screenings as appropriate. After reviewing your medical record and screening and assessments performed today your provider may have ordered immunizations, labs, imaging, and/or referrals for you. A list of these orders (if applicable) as well as your Preventive Care list are included within your After Visit Summary for your review. Other Preventive Recommendations:    · A preventive eye exam performed by an eye specialist is recommended every 1-2 years to screen for glaucoma; cataracts, macular degeneration, and other eye disorders. · A preventive dental visit is recommended every 6 months. · Try to get at least 150 minutes of exercise per week or 10,000 steps per day on a pedometer . · Order or download the FREE \"Exercise & Physical Activity: Your Everyday Guide\" from [employer]. Call 1-670.415.3864 or search [employer] online. · You need 1184-9656 mg of calcium and 7570-7233 IU of vitamin D per day. It is possible to meet your calcium requirement with diet alone, but a vitamin D supplement is usually necessary to meet this goal.  · When exposed to the sun, use a sunscreen that protects against both UVA and UVB radiation with an SPF of 30 or greater. Reapply every 2 to 3 hours or after sweating, drying off with a towel, or swimming. · Always wear a seat belt when traveling in a car. Always wear a helmet when riding a bicycle or motorcycle.

## 2020-11-16 NOTE — PROGRESS NOTES
[facility]    Emphysema (subcutaneous) (surgical) resulting from a procedure     Upper Lobes    Emphysema lung (HCC)     GERD (gastroesophageal reflux disease)     Hyperlipidemia     Hypertension     IFG (impaired fasting glucose)     Obesity     Pulmonary fibrosis (HCC)     Benign Nodules - restrictive    Vitamin D insufficiency        Past Surgical History:   Procedure Laterality Date    APPENDECTOMY      BRONCHOSCOPY  05/12/2017    Bedside - 6/18/18 - [place] - [facility] - 8/13/18    CABG WITH AORTIC VALVE REPAIR  04/20/2017    X 2 [facility]    CATARACT REMOVAL WITH IMPLANT Bilateral     OS 11/7/18, OD 11/19/18 - [place]    CHOLECYSTECTOMY      DIAPHRAGM SURGERY Left 08/11/2017    LUNG DECORTICATION      LUNG TRANSPLANT, 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    LUNG TRANSPLANT, DOUBLE      THORACOTOMY      TRACHEOSTOMY  05/01/2017    VOCAL CORD SURGERY      Nodules, Skin Lesions - [facility]         Family History   Problem Relation Age of Onset    Heart Failure Mother     Other Father         AAA    Cancer Brother         Lung    Coronary Art Dis Brother     Heart Failure Brother         Methothelioma       CareTeam (Including outside providers/suppliers regularly involved in providing care):   Patient Care Team:  Jason Patel DO as PCP - General (Family Medicine)  Jason Patel DO as PCP - [facility] Empaneled Provider  Corrina Kaba DO    Wt Readings from Last 3 Encounters:   11/16/20 194 lb (88 kg)   08/12/20 187 lb (84.8 kg)   05/15/20 187 lb (84.8 kg)     Vitals:    11/16/20 1306   BP: 124/60   Pulse: 74   Temp: 96.8 °F (36 °C)   SpO2: 96%   Weight: 194 lb (88 kg)   Height: 5' 4\" (1.626 m)     Body mass index is 33.3 kg/m². Based upon direct observation of the patient, evaluation of cognition reveals recent and remote memory intact. Physical Exam  Vitals signs and nursing note reviewed. Constitutional:       General: He is not in acute distress. Appearance: Normal appearance. He is well-developed.  He is obese. HENT:      Head: Normocephalic and atraumatic. Right Ear: Hearing and external ear normal.      Left Ear: Hearing and external ear normal.      Nose:      Comments: Patient wearing mask  Eyes:      General: Lids are normal. No scleral icterus. Extraocular Movements: Extraocular movements intact. Conjunctiva/sclera: Conjunctivae normal.   Neck:      Musculoskeletal: Normal range of motion and neck supple. Thyroid: No thyromegaly. Cardiovascular:      Rate and Rhythm: Normal rate and regular rhythm. Heart sounds: Normal heart sounds. No murmur. Arteriovenous access: left arteriovenous access is present. Comments: Palpable thrill of AV fistula  Pulmonary:      Effort: Pulmonary effort is normal. No respiratory distress. Breath sounds: Normal breath sounds. No wheezing. Musculoskeletal: Normal range of motion. General: No tenderness or deformity. Right lower leg: No edema. Left lower leg: No edema. Lymphadenopathy:      Cervical: No cervical adenopathy. Skin:     General: Skin is warm and dry. Findings: Bruising present. No rash. Neurological:      General: No focal deficit present. Mental Status: He is alert and oriented to person, place, and time. Gait: Gait normal.   Psychiatric:         Mood and Affect: Mood and affect normal.         Speech: Speech normal.         Behavior: Behavior normal.         Thought Content: Thought content normal.           Patient's complete Health Risk Assessment and screening values have been reviewed and are found in Flowsheets. The following problems were reviewed today and where indicated follow up appointments were made and/or referrals ordered. Positive Risk Factor Screenings with Interventions:       General Health and ACP:  General  In general, how would you say your health is?: Good  In the past 7 days, have you experienced any of the following?  New or Increased Pain, New or Increased Fatigue, Loneliness, Social Isolation, Stress or Anger?: (!) New or Increased Pain  Do you get the social and emotional support that you need?: Yes  Do you have a Living Will?: Yes  Advance Directives     Power of  Living Will ACP-Advance Directive ACP-Power of     Not on File Not on File [address] [facility] Risk Interventions:  · Pain issues: Patient likely with R greater trochanteric bursitis- will use Voltaren, Ice/Heat    Health Habits/Nutrition:  Health Habits/Nutrition  Do you exercise for at least 20 minutes 2-3 times per week?: (!) No  Have you lost any weight without trying in the past 3 months?: No  Do you eat fewer than 2 meals per day?: No  Have you seen a dentist within the past year?: Yes  Body mass index: (!) 33.3  Health Habits/Nutrition Interventions:  · Inadequate physical activity:  patient is not ready to increase his/her physical activity level at this time      Personalized Preventive Plan   Current Health Maintenance Status  Immunization History   Administered Date(s) Administered    Influenza Virus Vaccine 11/05/2010, 10/18/2011, 09/18/2012, 09/17/2013, 09/24/2014, 10/27/2015, 09/19/2016    Influenza, High Dose (Fluzone 65 yrs and older) 10/13/2015, 10/08/2017, 10/10/2018, 10/22/2019, 10/14/2020    Influenza, Triv, inactivated, subunit, adjuvanted, IM (Fluad 65 yrs and older) 10/11/2018    Pneumococcal Conjugate 13-valent ([id number]) 11/15/2016    Pneumococcal Polysaccharide ([id number]) 10/16/2014, 10/12/2016    Tdap (Boostrix, Adacel) 06/26/2019        Health Maintenance   Topic Date Due    Colon cancer screen colonoscopy  03/08/1999    A1C test (Diabetic or Prediabetic)  09/14/2017    Annual Wellness Visit (AWV)  03/06/2020    Shingles Vaccine (1 of 2) 11/16/2021 (Originally 3/8/1999)    Potassium monitoring  04/07/2021    Creatinine monitoring  04/07/2021    Lipid screen  09/14/2021    DTaP/Tdap/Td vaccine (2 - Td) 06/26/2029    Flu vaccine  Completed    Pneumococcal 65+ yrs at Risk Vaccine  Completed    Hepatitis A vaccine  Aged Out    Hepatitis B vaccine  Aged Out    Hib vaccine  Aged Out    Meningococcal (ACWY) vaccine  Aged Out    AAA screen  Discontinued    Hepatitis C screen  Discontinued       Recommendations for Preventive Services Due: see orders and patient instructions/AVS.    Recommended screening schedule for the next 5-10 years is provided to the patient in written form: see Patient Instructions/AVS.      Assessment and Plan  Kamran Sharp was seen today for medicare awv. Diagnoses and all orders for this visit:    Routine general medical examination at a health care facility  HRA reviewed and addressed. Patient UTD on HM. Had Colonoscopy virtually at [facility] in May 2020. Impaired fasting glucose  -     POCT glycosylated hemoglobin (Hb A1C)          Return in 6 months (on 5/16/2021) for Recheck.       Seen By:  Joanna Cheek DO

## 2021-01-20 ENCOUNTER — TELEPHONE (OUTPATIENT)
Dept: FAMILY MEDICINE CLINIC | Age: 71
End: 2021-01-20

## 2021-01-20 DIAGNOSIS — M15.9 GENERALIZED OSTEOARTHRITIS: Primary | ICD-10-CM

## 2021-01-20 DIAGNOSIS — R26.2 DIFFICULTY WALKING: ICD-10-CM

## 2021-01-20 DIAGNOSIS — R53.1 GENERALIZED WEAKNESS: ICD-10-CM

## 2021-01-20 NOTE — TELEPHONE ENCOUNTER
Néstor Kerr from [place] [facility] calling and pt was discharged from [address] [place] [address] Road [address] and they are wondering if they can get a referral for PT & OT services. Face sheet, list of meds and last OV faxed with referral.       Fax to: [phone]  Attn: [place].      Néstor Kerr: [phone]

## 2021-02-23 ENCOUNTER — TELEPHONE (OUTPATIENT)
Dept: PRIMARY CARE CLINIC | Age: 71
End: 2021-02-23

## 2021-02-23 NOTE — TELEPHONE ENCOUNTER
Patient was at [facility] for [facility] Shoulder injury. He is to follow up with PCP. No appointments available with in the next two weeks. Please follow up with patient if he is able to fit on schedule.

## 2021-03-08 ENCOUNTER — TELEPHONE (OUTPATIENT)
Dept: ADMINISTRATIVE | Age: 71
End: 2021-03-08

## 2021-03-08 ENCOUNTER — OFFICE VISIT (OUTPATIENT)
Dept: FAMILY MEDICINE CLINIC | Age: 71
End: 2021-03-08
Payer: MEDICARE

## 2021-03-08 VITALS
HEART RATE: 85 BPM | HEIGHT: 64 IN | BODY MASS INDEX: 30.73 KG/M2 | WEIGHT: 180 LBS | TEMPERATURE: 96.6 F | RESPIRATION RATE: 18 BRPM | DIASTOLIC BLOOD PRESSURE: 68 MMHG | SYSTOLIC BLOOD PRESSURE: 122 MMHG | OXYGEN SATURATION: 97 %

## 2021-03-08 DIAGNOSIS — M19.012 PRIMARY OSTEOARTHRITIS OF LEFT SHOULDER: Primary | ICD-10-CM

## 2021-03-08 PROCEDURE — 20610 DRAIN/INJ JOINT/BURSA W/O US: CPT | Performed by: FAMILY MEDICINE

## 2021-03-08 RX ORDER — TRIAMCINOLONE ACETONIDE 40 MG/ML
40 INJECTION, SUSPENSION INTRA-ARTICULAR; INTRAMUSCULAR ONCE
Status: COMPLETED | OUTPATIENT
Start: 2021-03-08 | End: 2021-03-08

## 2021-03-08 RX ORDER — BUSPIRONE HYDROCHLORIDE 5 MG/1
5 TABLET ORAL 3 TIMES DAILY
COMMUNITY
End: 2021-03-30

## 2021-03-08 RX ADMIN — TRIAMCINOLONE ACETONIDE 40 MG: 40 INJECTION, SUSPENSION INTRA-ARTICULAR; INTRAMUSCULAR at 15:48

## 2021-03-08 ASSESSMENT — ENCOUNTER SYMPTOMS: RESPIRATORY NEGATIVE: 1

## 2021-03-08 NOTE — TELEPHONE ENCOUNTER
Pt requesting appt today/tomorrow. One week ago, pt went to sit down and felt something odd in his lower back. Having thigh and back pain since that day. No availability.     Please advise pt,      Thank you

## 2021-03-08 NOTE — PROGRESS NOTES
appearance. HENT:      Head: Normocephalic and atraumatic. Cardiovascular:      Rate and Rhythm: Normal rate and regular rhythm. Heart sounds: No murmur. Pulmonary:      Effort: Pulmonary effort is normal.      Breath sounds: Normal breath sounds. Musculoskeletal:         General: Tenderness present. No signs of injury. Left shoulder: He exhibits decreased range of motion, tenderness, pain, spasm and decreased strength. Arms:    Skin:     General: Skin is warm and dry. Neurological:      Mental Status: He is alert. Assessment and Plan:  Baltazar Bello was seen today for shoulder pain and leg pain. Diagnoses and all orders for this visit:    Primary osteoarthritis of left shoulder  -     20610 - SD DRAIN/INJECT LARGE JOINT/BURSA    Other orders  -     triamcinolone acetonide (KENALOG-40) injection 40 mg        Orders Placed This Encounter   Medications    triamcinolone acetonide (KENALOG-40) injection 40 mg        Patient advised to follow up with PCP as needed. Seen By:  Alex Lee, DO  Is very tight hamstrings and we showed him how to stretch stretch his quads and hamstrings. In addition under aseptic technique the left shoulder was prepped and draped and injected with 40 mg of Kenalog and 3 cc of 2% lidocaine suprascapularly. The use ice on the left shoulder and he can take Tylenol 2 tablets every 6 hours. For pain.

## 2021-03-25 ENCOUNTER — TELEPHONE (OUTPATIENT)
Dept: ADMINISTRATIVE | Age: 71
End: 2021-03-25

## 2021-03-30 ENCOUNTER — OFFICE VISIT (OUTPATIENT)
Dept: PRIMARY CARE CLINIC | Age: 71
End: 2021-03-30
Payer: MEDICARE

## 2021-03-30 VITALS
HEART RATE: 73 BPM | BODY MASS INDEX: 30.05 KG/M2 | SYSTOLIC BLOOD PRESSURE: 100 MMHG | OXYGEN SATURATION: 96 % | HEIGHT: 64 IN | WEIGHT: 176 LBS | TEMPERATURE: 97.6 F | DIASTOLIC BLOOD PRESSURE: 58 MMHG

## 2021-03-30 DIAGNOSIS — M25.561 ACUTE PAIN OF RIGHT KNEE: Primary | ICD-10-CM

## 2021-03-30 PROCEDURE — 99213 OFFICE O/P EST LOW 20 MIN: CPT | Performed by: FAMILY MEDICINE

## 2021-03-30 RX ORDER — MIDODRINE HYDROCHLORIDE 10 MG/1
TABLET ORAL
COMMUNITY
Start: 2021-03-09

## 2021-03-30 RX ORDER — CALCITRIOL 0.25 UG/1
CAPSULE, LIQUID FILLED ORAL
COMMUNITY
Start: 2021-02-05

## 2021-03-30 RX ORDER — ONDANSETRON 4 MG/1
4 TABLET, FILM COATED ORAL EVERY 8 HOURS PRN
COMMUNITY
Start: 2021-03-10

## 2021-03-30 RX ORDER — TRAMADOL HYDROCHLORIDE 50 MG/1
50 TABLET ORAL EVERY 8 HOURS PRN
Qty: 42 TABLET | Refills: 0 | Status: SHIPPED | OUTPATIENT
Start: 2021-03-30 | End: 2021-04-13

## 2021-03-30 RX ORDER — SODIUM POLYSTYRENE SULFONATE 15 G/60ML
SUSPENSION ORAL; RECTAL
COMMUNITY
Start: 2021-03-20

## 2021-03-30 RX ORDER — BUSPIRONE HYDROCHLORIDE 10 MG/1
10 TABLET ORAL 3 TIMES DAILY
COMMUNITY
Start: 2021-03-16 | End: 2021-06-02 | Stop reason: SDUPTHER

## 2021-03-30 RX ORDER — FAMOTIDINE 20 MG/1
TABLET, FILM COATED ORAL
COMMUNITY
Start: 2021-03-16 | End: 2021-06-02 | Stop reason: SDUPTHER

## 2021-03-30 ASSESSMENT — PATIENT HEALTH QUESTIONNAIRE - PHQ9
SUM OF ALL RESPONSES TO PHQ QUESTIONS 1-9: 0
1. LITTLE INTEREST OR PLEASURE IN DOING THINGS: 0
SUM OF ALL RESPONSES TO PHQ QUESTIONS 1-9: 0

## 2021-03-30 NOTE — PROGRESS NOTES
3/30/21  Natalie Grimsley : 1950 Sex: male  Age: 66 y.o. Chief Complaint   Patient presents with   [facility] ED Follow-up     pulled hamstring right leg, fall on 3/24     HPI:  66 y.o. male presents today for ER follow up. Patient's chart, medical, surgical and medication history all reviewed. Patient was seen at [facility] ER on 3/24/21 after experiencing a fall at home. No ER records available for review at this time. He states he had xrays completed without evidence of fracture or dislocation. He was given a knee brace and discharged home. He does have an appt with Dr. Lori Hutchison next week. ROS:  Review of Systems   Constitutional: Negative for chills, fatigue and fever. Respiratory: Negative for cough, shortness of breath and wheezing. Cardiovascular: Negative for chest pain and palpitations. Gastrointestinal: Negative for abdominal pain, constipation, diarrhea, nausea and vomiting. Musculoskeletal: Positive for arthralgias and gait problem. Negative for back pain. Skin: Negative for rash. Neurological: Negative for dizziness and headaches. Psychiatric/Behavioral: Positive for sleep disturbance. Negative for dysphoric mood. The patient is nervous/anxious. All other systems reviewed and are negative.        Current Outpatient Medications on File Prior to Visit   Medication Sig Dispense Refill    SPS 15 GM/60ML suspension TAKE 60 ML ON  FOR 4 WEEKS      ondansetron (ZOFRAN) 4 MG tablet Take 4 mg by mouth every 8 hours as needed      midodrine (PROAMATINE) 10 MG tablet TAKE 1 TABLET BY MOUTH EVERY   AND FRIDAY BEFORE DIALYSIS      famotidine (PEPCID) 20 MG tablet TAKE ONE TABLET BY MOUTH EVERY DAY      busPIRone (BUSPAR) 10 MG tablet TAKE ONE TABLET BY MOUTH THREE TIMES A DAY      calcitRIOL (ROCALTROL) 0.25 MCG capsule Calcitriol Calcitriol (Rocaltrol) 0.25 MCG Capsule Active 0.25 MCG PO Daily 2021 8:49pm 2021  [facility] ([id number])      FLUoxetine (PROZAC) 40 MG capsule TAKE ONE CAPSULE BY MOUTH DAILY 90 capsule 1    acetaminophen (TYLENOL) 325 MG tablet Take 650 mg by mouth every 6 hours as needed for Pain      benzonatate (TESSALON) 100 MG capsule Take 100 mg by mouth 3 times daily as needed for Cough      calcium acetate 667 MG TABS Take 667 mg by mouth every 12 hours      vitamin D 25 MCG (1000 UT) CAPS Take 2,000 Units by mouth daily      sodium chloride, Inhalant, 7 % nebulizer solution Take 4 mLs by nebulization as needed for Other      hydrOXYzine (VISTARIL) 25 MG capsule Take 25 mg by mouth 3 times daily as needed      sulfamethoxazole-trimethoprim (BACTRIM;SEPTRA) 400-80 MG per tablet Take 1 tablet by mouth      furosemide (LASIX) 40 MG tablet Take 80 mg by mouth       ondansetron (ZOFRAN-ODT) 4 MG disintegrating tablet Take 4 mg by mouth every 8 hours as needed for Nausea or Vomiting      isosorbide dinitrate (ISORDIL) 10 MG tablet Take 5 mg by mouth 3 times daily      folic acid (FOLVITE) 556 MCG tablet Take 400 mcg by mouth daily      aspirin 81 MG tablet Take 81 mg by mouth daily      metoprolol succinate (TOPROL XL) 25 MG extended release tablet Take 25 mg by mouth daily      fluticasone (FLONASE) 50 MCG/ACT nasal spray 1 spray by Each Nostril route daily      cycloSPORINE (SANDIMMUNE) 100 MG capsule Take 100 mg by mouth 2 times daily      cycloSPORINE (SANDIMMUNE) 25 MG capsule Take 25 mg by mouth daily      valGANciclovir (VALCYTE) 450 MG tablet Take 450 mg by mouth      calcium carbonate (TUMS) 500 MG chewable tablet Take 2 tablets by mouth daily      predniSONE (DELTASONE) 5 MG tablet Take 5 mg by mouth daily      hydrALAZINE (APRESOLINE) 25 MG tablet Take by mouth 3 times daily       nitroGLYCERIN (NITROSTAT) 0.4 MG SL tablet Place 0.4 mg under the tongue every 5 minutes as needed for Chest pain up to max of 3 total doses. If no relief after 1 dose, call 911.       guaiFENesin 400 MG tablet Take 400 mg by mouth 4 times daily as needed for Cough      Simethicone 40 MG STRP Take by mouth      Multiple Vitamins-Minerals (THERAPEUTIC MULTIVITAMIN-MINERALS) tablet Take 1 tablet by mouth daily       No current facility-administered medications on file prior to visit.         Allergies   Allergen Reactions    Ceftriaxone Itching     Itching at IV site & red streak along the arm within 5 minutes of infusion      Heparin Other (See Comments)     MEÑO      Lorazepam Other (See Comments)    Tacrolimus Other (See Comments)     Agitation         Past Medical History:   Diagnosis Date    Allergic rhinitis     CAD (coronary artery disease)     Ishcemia CABS X 2 4/20/17 [facility]    COPD (chronic obstructive pulmonary disease) (HCC)     O2 3L [facility]    Emphysema (subcutaneous) (surgical) resulting from a procedure     [place]    Emphysema lung (HCC)     GERD (gastroesophageal reflux disease)     Hyperlipidemia     Hypertension     IFG (impaired fasting glucose)     Obesity     Pulmonary fibrosis (HCC)     Benign Nodules - restrictive    Vitamin D insufficiency      Past Surgical History:   Procedure Laterality Date    APPENDECTOMY      BRONCHOSCOPY  05/12/2017    Bedside - 6/18/18 - [facility] - [facility] - 8/13/18    CABG WITH AORTIC VALVE REPAIR  04/20/2017    X 2 [facility]    CATARACT REMOVAL WITH IMPLANT Bilateral     OS 11/7/18, OD 11/19/18 - [place]    CHOLECYSTECTOMY      DIAPHRAGM SURGERY Left 08/11/2017    LUNG DECORTICATION      LUNG TRANSPLANT, 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    LUNG TRANSPLANT, DOUBLE      THORACOTOMY      TRACHEOSTOMY  05/01/2017    VOCAL CORD SURGERY      Nodules, Skin Lesions - [facility]     Family History   Problem Relation Age of Onset    Heart Failure Mother     Other Father         AAA    Cancer Brother         Lung    Coronary Art Dis Brother     Heart Failure Brother         Methothelioma     Social History     Socioeconomic History    Marital status:      Spouse name: Not on file    Number of children: Not on file    Years of education: Not on file    Highest education level: Not on file   Occupational History    Not on file   Social Needs    Financial resource strain: Not on file    Food insecurity     Worry: Not on file     Inability: Not on file    Transportation needs     Medical: Not on file     Non-medical: Not on file   Tobacco Use    Smoking status: Former Smoker     Packs/day: 2.00     Years: 35.00     Pack years: 70.00     Quit date: 1997     Years since quittin.5    Smokeless tobacco: Never Used    Tobacco comment: Quit 18 years ago   Substance and Sexual Activity    Alcohol use: Never     Frequency: Never    Drug use: Never    Sexual activity: Not on file   Lifestyle    Physical activity     Days per week: Not on file     Minutes per session: Not on file    Stress: Not on file   Relationships    Social connections     Talks on phone: Not on file     Gets together: Not on file     Attends Gnosticism service: Not on file     Active member of club or organization: Not on file     Attends meetings of clubs or organizations: Not on file     Relationship status: Not on file    Intimate partner violence     Fear of current or ex partner: Not on file     Emotionally abused: Not on file     Physically abused: Not on file     Forced sexual activity: Not on file   Other Topics Concern    Not on file   Social History Narrative    Not on file       Vitals:    21 1410   BP: (!) 100/58   Pulse: 73   Temp: 97.6 °F (36.4 °C)   SpO2: 96%   Weight: 176 lb (79.8 kg)   Height: 5' 4\" (1.626 m)       Physical Exam:  Physical Exam  Vitals signs and nursing note reviewed. Constitutional:       General: He is not in acute distress. Appearance: Normal appearance. He is well-developed. He is obese. HENT:      Head: Normocephalic and atraumatic.       Right Ear: Hearing and external ear normal.      Left Ear: Hearing and external ear normal.      Nose: Comments: Patient wearing mask  Eyes:      General: Lids are normal. No scleral icterus. Extraocular Movements: Extraocular movements intact. Conjunctiva/sclera: Conjunctivae normal.   Neck:      Musculoskeletal: Normal range of motion and neck supple. Thyroid: No thyromegaly. Cardiovascular:      Rate and Rhythm: Normal rate and regular rhythm. Heart sounds: Normal heart sounds. No murmur. Arteriovenous access: left arteriovenous access is present. Comments: Palpable thrill of AV fistula  Pulmonary:      Effort: Pulmonary effort is normal. No respiratory distress. Breath sounds: Normal breath sounds. No wheezing. Musculoskeletal:         General: No tenderness or deformity. Right lower leg: No edema. Left lower leg: No edema. Comments: R knee in immobilizer brace   Lymphadenopathy:      Cervical: No cervical adenopathy. Skin:     General: Skin is warm and dry. Findings: Bruising present. No rash. Neurological:      General: No focal deficit present. Mental Status: He is alert and oriented to person, place, and time. Gait: Gait abnormal (seated in wheelchair). Psychiatric:         Mood and Affect: Mood and affect normal.         Speech: Speech normal.         Behavior: Behavior normal.         Thought Content:  Thought content normal.         Labs:  CBC with Differential:    Lab Results   Component Value Date    WBC 12.4 11/28/2020    RBC 2.86 11/28/2020    HGB 10.3 11/28/2020    HCT 30.8 11/28/2020     11/28/2020    .5 11/28/2020    MCH 35.8 11/28/2020    MCHC 33.3 11/28/2020    RDW 16.4 11/28/2020    SEGSPCT 56.9 11/28/2020    LYMPHOPCT 39.0 11/28/2020    MONOPCT 2.8 11/28/2020    BASOPCT 1.0 11/28/2020    MONOSABS 0.4 11/28/2020    LYMPHSABS 4.9 11/28/2020    EOSABS 0.0 11/28/2020    BASOSABS 0.1 11/28/2020     CMP:    Lab Results   Component Value Date     11/28/2020    K 5.7 11/28/2020    CL 96 11/28/2020    CO2 23 11/28/2020    BUN 88 11/28/2020    CREATININE 9.1 11/28/2020    GFRAA 23 04/07/2020    AGRATIO 1.2 11/28/2020    LABGLOM 6 11/28/2020    GLUCOSE 146 11/28/2020    PROT 6.6 11/28/2020    LABALBU 3.6 11/28/2020    CALCIUM 8.9 11/28/2020    BILITOT 1.0 11/28/2020    ALKPHOS 97 11/28/2020    AST 43 11/28/2020    ALT 36 11/28/2020     Ionized Calcium:  No results found for: IONCA  Magnesium:  No results found for: MG  Phosphorus:  No results found for: PHOS  Uric Acid:  No results found for: Wali Irons  U/A:  No results found for: NITRITE, COLORU, PROTEINU, PHUR, LABCAST, [address], RBCUA, MUCUS, TRICHOMONAS, YEAST, BACTERIA, CLARITYU, SPECGRAV, LEUKOCYTESUR, UROBILINOGEN, BILIRUBINUR, BLOODU, GLUCOSEU, AMORPHOUS  HgBA1c:    Lab Results   Component Value Date    LABA1C 5.1 11/16/2020     Microalbumen/Creatinine ratio:  No components found for: RUCREAT  FLP:  No results found for: TRIG, HDL, LDLCALC, LDLDIRECT, LABVLDL  TSH:  No results found for: TSH  PSA: No results found for: PSA       Assessment and Plan:  Sherif Bustos was seen today for ed follow-up. Diagnoses and all orders for this visit:    Acute pain of right knee  -     traMADol (ULTRAM) 50 MG tablet; Take 1 tablet by mouth every 8 hours as needed for Pain for up to 14 days. Intended supply: 14 days. Take lowest dose possible to manage pain    Discussed conservative measures with patient. Unable to take NSAIDs due to renal function. Tylenol has not been helpful. Will try Tramadol for pain relief. Seeing Dr. Danial Mallory next week. Return if symptoms worsen or fail to improve.       Seen By:  Arnulfo Ibarra DO

## 2021-03-31 ASSESSMENT — ENCOUNTER SYMPTOMS
WHEEZING: 0
NAUSEA: 0
VOMITING: 0
COUGH: 0
SHORTNESS OF BREATH: 0
BACK PAIN: 0
CONSTIPATION: 0
DIARRHEA: 0
ABDOMINAL PAIN: 0

## 2021-04-08 ENCOUNTER — CARE COORDINATION (OUTPATIENT)
Dept: CARE COORDINATION | Age: 71
End: 2021-04-08

## 2021-04-08 NOTE — CARE COORDINATION
-ACM attempted to reach patient to offer enrollment into Care Coordination program, however no answer. -HIPAA compliant VM left introducing self, reason for call, and brief explanation of program.  -Left ACM's contact information, requesting call back.  -Will send Iintroduction letter to patient via [address]

## 2021-04-08 NOTE — LETTER
4/8/2021    [address] [place] [address]      Dear Brock Isreal,    My name is Tian Lane and I am a registered nurse who partners with Estelle Looney DO to improve patients' health. Estelle Looney DO believes you would benefit from working with me. As a member of your health care team, I would work with other providers involved in your care, offer education for your specific health conditions, and connect you with additional resources as needed. I will collaborate with Estelle Looney DO to support you in following your treatment plan. The additional support I provide is no additional cost to you. My primary focus is to help you achieve specific goals and improve your health. We are committed to walk with you on this journey and look forward to working with you. Please call me to further discuss your healthcare needs. You can reach me at [phone].      In good health,     Tian Lane RN

## 2021-04-12 ENCOUNTER — CARE COORDINATION (OUTPATIENT)
Dept: CARE COORDINATION | Age: 71
End: 2021-04-12

## 2021-04-12 NOTE — CARE COORDINATION
-This ACM currently covering for primary ACM for Dr. Zion Pierre.   -Pt returned call from [employer] [facility] regarding enrollment into the care coordination program.  -This ACM discussed role of the ACM and explained the program in detail to the pt.  -He is declining enrollment at this time.  -Pt states he has sufficient support from his wife (retired RN) as well as through the [facility] and [facility]. Pt states that he has hx of double lung transplant and is working with [facility] for kidney transplant and has good support there.  -Pt was encouraged to keep Eleanor's information if needs do arise in the future. He voiced understanding  -ACM will update [employer] [facility] on today's call and pt's decline for CC enrollment.

## 2021-06-02 ENCOUNTER — OFFICE VISIT (OUTPATIENT)
Dept: PRIMARY CARE CLINIC | Age: 71
End: 2021-06-02
Payer: MEDICARE

## 2021-06-02 VITALS
HEIGHT: 64 IN | OXYGEN SATURATION: 97 % | WEIGHT: 180 LBS | DIASTOLIC BLOOD PRESSURE: 64 MMHG | TEMPERATURE: 97.5 F | BODY MASS INDEX: 30.73 KG/M2 | SYSTOLIC BLOOD PRESSURE: 120 MMHG | HEART RATE: 79 BPM

## 2021-06-02 DIAGNOSIS — I50.32 CHRONIC DIASTOLIC CHF (CONGESTIVE HEART FAILURE) (HCC): ICD-10-CM

## 2021-06-02 DIAGNOSIS — I10 BENIGN ESSENTIAL HYPERTENSION: Primary | ICD-10-CM

## 2021-06-02 DIAGNOSIS — N18.6 STAGE 5 CHRONIC KIDNEY DISEASE ON DIALYSIS (HCC): ICD-10-CM

## 2021-06-02 DIAGNOSIS — Z99.2 STAGE 5 CHRONIC KIDNEY DISEASE ON DIALYSIS (HCC): ICD-10-CM

## 2021-06-02 DIAGNOSIS — R21 RASH AND NONSPECIFIC SKIN ERUPTION: ICD-10-CM

## 2021-06-02 DIAGNOSIS — K21.9 GASTROESOPHAGEAL REFLUX DISEASE WITHOUT ESOPHAGITIS: ICD-10-CM

## 2021-06-02 DIAGNOSIS — F41.1 GAD (GENERALIZED ANXIETY DISORDER): ICD-10-CM

## 2021-06-02 DIAGNOSIS — Z94.2 LUNG TRANSPLANT STATUS, BILATERAL (HCC): ICD-10-CM

## 2021-06-02 DIAGNOSIS — I25.118 CORONARY ARTERY DISEASE OF NATIVE HEART WITH STABLE ANGINA PECTORIS, UNSPECIFIED VESSEL OR LESION TYPE (HCC): ICD-10-CM

## 2021-06-02 PROBLEM — D75.829 HIT (HEPARIN-INDUCED THROMBOCYTOPENIA): Status: RESOLVED | Noted: 2020-08-28 | Resolved: 2021-06-02

## 2021-06-02 PROBLEM — Z95.1 S/P CABG X 2: Status: ACTIVE | Noted: 2017-04-20

## 2021-06-02 PROCEDURE — 4040F PNEUMOC VAC/ADMIN/RCVD: CPT | Performed by: FAMILY MEDICINE

## 2021-06-02 PROCEDURE — G8427 DOCREV CUR MEDS BY ELIG CLIN: HCPCS | Performed by: FAMILY MEDICINE

## 2021-06-02 PROCEDURE — 1123F ACP DISCUSS/DSCN MKR DOCD: CPT | Performed by: FAMILY MEDICINE

## 2021-06-02 PROCEDURE — 3017F COLORECTAL CA SCREEN DOC REV: CPT | Performed by: FAMILY MEDICINE

## 2021-06-02 PROCEDURE — G8417 CALC BMI ABV UP PARAM F/U: HCPCS | Performed by: FAMILY MEDICINE

## 2021-06-02 PROCEDURE — 1036F TOBACCO NON-USER: CPT | Performed by: FAMILY MEDICINE

## 2021-06-02 PROCEDURE — 99214 OFFICE O/P EST MOD 30 MIN: CPT | Performed by: FAMILY MEDICINE

## 2021-06-02 RX ORDER — BUSPIRONE HYDROCHLORIDE 10 MG/1
20 TABLET ORAL 3 TIMES DAILY
Qty: 540 TABLET | Refills: 1 | Status: SHIPPED | OUTPATIENT
Start: 2021-06-02 | End: 2021-08-31

## 2021-06-02 RX ORDER — FAMOTIDINE 40 MG/1
40 TABLET, FILM COATED ORAL DAILY
Qty: 90 TABLET | Refills: 1 | Status: SHIPPED
Start: 2021-06-02 | End: 2021-10-27

## 2021-06-02 RX ORDER — TRIAMCINOLONE ACETONIDE 5 MG/G
CREAM TOPICAL
Qty: 15 G | Refills: 1 | Status: SHIPPED
Start: 2021-06-02 | End: 2021-10-27

## 2021-06-02 ASSESSMENT — ENCOUNTER SYMPTOMS
SHORTNESS OF BREATH: 0
COUGH: 0
ABDOMINAL PAIN: 0
DIARRHEA: 0
BACK PAIN: 0
VOMITING: 0
NAUSEA: 0
CONSTIPATION: 0
WHEEZING: 0

## 2021-06-02 NOTE — PROGRESS NOTES
21  Milvia Edmond : 1950 Sex: male  Age: 66 y.o. Chief Complaint   Patient presents with    Pruritis     on left ear and around neck for about a few months, has been using lotion and itch cream, noticed a couple meds has side effects that are itching    Hypertension    Hyperlipidemia    Anxiety     HPI:  66 y.o. male presents today for 6 month(s) follow up of chronic medical conditions, medication refills. Patient's chart, medical, surgical and medication history all reviewed. Patient following with [facility] for status post lung transplant therapies. Now on waiting list for kidney transplants. Undergoing dialysis currently. Some days good, some days bad. Hypertension   The patient presents today for follow up of HTN. The problem is well controlled. Risk factors for coronary artery disease include Age > 27, male and HTN. Current treatments include metoprolol (Lopressor, Toprol), Isordil, Hydralazine. The patient is compliant all of the time. Lifestyle changes the patient has made include none. Today the patient is complaining of none. Anxiety  Patient complains of evaluation of anxiety disorder and panic attacks. He has the following anxiety symptoms: feelings of losing control, irritable, racing thoughts. Onset of symptoms was approximately several years ago, gradually worsening since that time. He denies current suicidal and homicidal ideation. Risk factors: negative life event - patient has been under increasing stress due to chronic medical conditions. Has had a lung transplant and is now waiting for kidney transplant. Having panic attacks prior to HD. Previous treatment includes Ativan, BuSpar, Prozac, Xanax, Zoloft and Zyprexa and no therapy. He complains of the following side effects from the treatment: hallucinations with benzos. Patient needing improvement in medications around the time of HD. Patient noting rash/itching of L ear.   States that he has tried 2.5% hydrocortisone, but not getting any relief. ROS:  Review of Systems   Constitutional: Negative for chills, fatigue and fever. Respiratory: Negative for cough, shortness of breath and wheezing. Cardiovascular: Negative for chest pain and palpitations. Gastrointestinal: Negative for abdominal pain, constipation, diarrhea, nausea and vomiting. Musculoskeletal: Negative for arthralgias and back pain. Skin: Positive for rash. Neurological: Negative for dizziness and headaches. Hematological: Bruises/bleeds easily. Psychiatric/Behavioral: Positive for sleep disturbance. Negative for dysphoric mood. The patient is nervous/anxious. All other systems reviewed and are negative.        Current Outpatient Medications on File Prior to Visit   Medication Sig Dispense Refill    SPS 15 GM/60ML suspension TAKE 60 ML ON SUNDAY FOR 4 WEEKS      ondansetron (ZOFRAN) 4 MG tablet Take 4 mg by mouth every 8 hours as needed      midodrine (PROAMATINE) 10 MG tablet TAKE 1 TABLET BY MOUTH EVERY MONDAY WEDNESDAY  AND FRIDAY BEFORE DIALYSIS      calcitRIOL (ROCALTROL) 0.25 MCG capsule Calcitriol Calcitriol (Rocaltrol) 0.25 MCG Capsule Active 0.25 MCG PO Daily February 5th, 2021 8:49pm 02-  [facility] ([id number])      FLUoxetine (PROZAC) 40 MG capsule TAKE ONE CAPSULE BY MOUTH DAILY 90 capsule 1    acetaminophen (TYLENOL) 325 MG tablet Take 650 mg by mouth every 6 hours as needed for Pain      benzonatate (TESSALON) 100 MG capsule Take 100 mg by mouth 3 times daily as needed for Cough      calcium acetate 667 MG TABS Take 667 mg by mouth every 12 hours      vitamin D 25 MCG (1000 UT) CAPS Take 2,000 Units by mouth daily      sodium chloride, Inhalant, 7 % nebulizer solution Take 4 mLs by nebulization as needed for Other      sulfamethoxazole-trimethoprim (BACTRIM;SEPTRA) 400-80 MG per tablet Take 1 tablet by mouth      furosemide (LASIX) 40 MG tablet Take 80 mg by mouth       isosorbide dinitrate (ISORDIL) 10 MG tablet Take 5 mg by mouth 3 times daily      folic acid (FOLVITE) 394 MCG tablet Take 400 mcg by mouth daily      aspirin 81 MG tablet Take 81 mg by mouth daily      metoprolol succinate (TOPROL XL) 25 MG extended release tablet Take 12.5 mg by mouth daily       fluticasone (FLONASE) 50 MCG/ACT nasal spray 1 spray by Each Nostril route daily      cycloSPORINE (SANDIMMUNE) 100 MG capsule Take 100 mg by mouth 2 times daily      cycloSPORINE (SANDIMMUNE) 25 MG capsule Take 25 mg by mouth daily      valGANciclovir (VALCYTE) 450 MG tablet Take 450 mg by mouth      calcium carbonate (TUMS) 500 MG chewable tablet Take 2 tablets by mouth daily      predniSONE (DELTASONE) 5 MG tablet Take 5 mg by mouth daily      hydrALAZINE (APRESOLINE) 25 MG tablet Take by mouth 3 times daily       nitroGLYCERIN (NITROSTAT) 0.4 MG SL tablet Place 0.4 mg under the tongue every 5 minutes as needed for Chest pain up to max of 3 total doses. If no relief after 1 dose, call 911.  guaiFENesin 400 MG tablet Take 400 mg by mouth 4 times daily as needed for Cough      Simethicone 40 MG STRP Take by mouth      Multiple Vitamins-Minerals (THERAPEUTIC MULTIVITAMIN-MINERALS) tablet Take 1 tablet by mouth daily       No current facility-administered medications on file prior to visit.        Allergies   Allergen Reactions    Ceftriaxone Itching     Itching at IV site & red streak along the arm within 5 minutes of infusion      Heparin Other (See Comments)     MEÑO      Lorazepam Other (See Comments)    Tacrolimus Other (See Comments)     Agitation         Past Medical History:   Diagnosis Date    Allergic rhinitis     CAD (coronary artery disease)     Ishcemia CABS X 2 4/20/17 [facility]    COPD (chronic obstructive pulmonary disease) ([facility])     O2 3L [facility]    Emphysema (subcutaneous) (surgical) resulting from a procedure     Upper Lobes    Emphysema lung ([facility])     GERD (gastroesophageal reflux disease)     Hyperlipidemia     Hypertension     IFG (impaired fasting glucose)     Obesity     Pulmonary fibrosis (HCC)     Benign Nodules - restrictive    Vitamin D insufficiency      Past Surgical History:   Procedure Laterality Date    APPENDECTOMY      BRONCHOSCOPY  2017    Bedside - 18 - [place] - [facility] - 18    CABG WITH AORTIC VALVE REPAIR  04/20/2017    X 2 [facility]    CATARACT REMOVAL WITH IMPLANT Bilateral     OS 18, OD 18 - [place]    CHOLECYSTECTOMY      DIAPHRAGM SURGERY Left 2017    LUNG DECORTICATION      LUNG TRANSPLANT, DOUBLE  2017    LUNG TRANSPLANT, DOUBLE      THORACOTOMY      TRACHEOSTOMY  2017    VOCAL CORD SURGERY      Nodules, Skin Lesions - [facility]     Family History   Problem Relation Age of Onset    Heart Failure Mother     Other Father         AAA    Cancer Brother         Lung    Coronary Art Dis Brother     Heart Failure Brother         Methothelioma     Social History     Socioeconomic History    Marital status:      Spouse name: Not on file    Number of children: Not on file    Years of education: Not on file    Highest education level: Not on file   Occupational History    Not on file   Tobacco Use    Smoking status: Former Smoker     Packs/day: 2.00     Years: 35.00     Pack years: 70.00     Quit date: 1997     Years since quittin.7    Smokeless tobacco: Never Used    Tobacco comment: Quit 18 years ago   Substance and Sexual Activity    Alcohol use: Never    Drug use: Never    Sexual activity: Not on file   Other Topics Concern    Not on file   Social History Narrative    Not on file     Social Determinants of Health     Financial Resource Strain:     Difficulty of Paying Living Expenses:    Food Insecurity:     Worried About Running Out of Food in the Last Year:     [address] in the Last Year:    Transportation Needs:     Lack of Transportation (Medical):      Lack of Transportation (Non-Medical):    Physical Activity:     Days of Exercise per Week:     Minutes of Exercise per Session:    Stress:     Feeling of Stress :    Social Connections:     Frequency of Communication with Friends and Family:     Frequency of Social Gatherings with Friends and Family:     Attends Confucianism Services:     Active Member of Clubs or Organizations:     Attends Club or Organization Meetings:     Marital Status:    Intimate Partner Violence:     Fear of Current or Ex-Partner:     Emotionally Abused:     Physically Abused:     Sexually Abused:        Vitals:    06/02/21 1017   BP: 120/64   Pulse: 79   Temp: 97.5 °F (36.4 °C)   SpO2: 97%   Weight: 180 lb (81.6 kg)   Height: 5' 4\" (1.626 m)       Physical Exam:  Physical Exam  Vitals and nursing note reviewed. Constitutional:       General: He is not in acute distress. Appearance: Normal appearance. He is well-developed. He is obese. HENT:      Head: Normocephalic and atraumatic. Right Ear: Hearing and external ear normal.      Left Ear: Hearing and external ear normal.      Nose:      Comments: Patient wearing mask  Eyes:      General: Lids are normal. No scleral icterus. Extraocular Movements: Extraocular movements intact. Conjunctiva/sclera: Conjunctivae normal.   Neck:      Thyroid: No thyromegaly. Cardiovascular:      Rate and Rhythm: Normal rate and regular rhythm. Heart sounds: Normal heart sounds. No murmur heard. Pulmonary:      Effort: Pulmonary effort is normal. No respiratory distress. Breath sounds: Normal breath sounds. No wheezing. Musculoskeletal:         General: No tenderness or deformity. Normal range of motion. Cervical back: Normal range of motion and neck supple. Right lower leg: No edema. Left lower leg: No edema. Lymphadenopathy:      Cervical: No cervical adenopathy. Skin:     General: Skin is warm and dry.       Findings: Rash (and erythema of L antihelix) present. Neurological:      General: No focal deficit present. Mental Status: He is alert and oriented to person, place, and time. Gait: Gait normal.   Psychiatric:         Mood and Affect: Mood and affect normal.         Speech: Speech normal.         Behavior: Behavior normal.         Thought Content: Thought content normal.         Labs:  CBC with Differential:    Lab Results   Component Value Date    WBC 12.4 2020    RBC 2.86 2020    HGB 10.3 2020    HCT 30.8 2020     2020    .5 2020    MCH 35.8 2020    MCHC 33.3 2020    RDW 16.4 2020    SEGSPCT 56.9 2020    LYMPHOPCT 39.0 2020    MONOPCT 2.8 2020    BASOPCT 1.0 2020    MONOSABS 0.4 2020    LYMPHSABS 4.9 2020    EOSABS 0.0 2020    BASOSABS 0.1 2020     CMP:    Lab Results   Component Value Date     2020    K 5.7 2020    CL 96 2020    CO2 23 2020    BUN 88 2020    CREATININE 9.1 2020    GFRAA 23 2020    AGRATIO 1.2 2020    LABGLOM 6 2020    GLUCOSE 146 2020    PROT 6.6 2020    LABALBU 3.6 2020    CALCIUM 8.9 2020    BILITOT 1.0 2020    ALKPHOS 97 2020    AST 43 2020    ALT 36 2020     HgBA1c:    Lab Results   Component Value Date    LABA1C 5.1 2020     FLP:  No results found for: TRIG, HDL, LDLCALC, LDLDIRECT, LABVLDL  TSH:  No results found for: TSH  PSA: No results found for: PSA       Assessment and Plan:  Tian Mancilla was seen today for pruritis, hypertension, hyperlipidemia and anxiety. Diagnoses and all orders for this visit:    Benign essential hypertension  Well controlled. Gets labs through HD and [facility]. MAXI (generalized anxiety disorder)  -     busPIRone (BUSPAR) 10 MG tablet; Take 2 tablets by mouth 3 times daily Takes an extra 5mg on Dialysis days.   Will try higher dose of Buspar as it has been helpful, but not as

## 2021-06-17 ENCOUNTER — TELEPHONE (OUTPATIENT)
Dept: PRIMARY CARE CLINIC | Age: 71
End: 2021-06-17

## 2021-06-17 DIAGNOSIS — Z12.11 SCREENING FOR COLON CANCER: Primary | ICD-10-CM

## 2021-06-17 DIAGNOSIS — Z01.818 PRE-TRANSPLANT EVALUATION FOR KIDNEY TRANSPLANT: ICD-10-CM

## 2021-06-17 NOTE — TELEPHONE ENCOUNTER
Patient agreeable to dermatology referral. He advised he would like referral to Dr. Honey Humphrey in [place] for colonoscopy.

## 2021-06-17 NOTE — TELEPHONE ENCOUNTER
Referral to Dr. Menchaca Goods placed.   I would recommend a follow up with Dr. Chinchilla Given for lesion on L ear lobe

## 2021-06-17 NOTE — TELEPHONE ENCOUNTER
Pt calling in to let you know that his ear is a little bit better. Pt is on second tube of cream but has not started it. Itching has not gone completely away. Pt also needs colonoscopy done for a kidney transplant. Asking if you can refer him for that.

## 2021-07-29 ENCOUNTER — NURSE ONLY (OUTPATIENT)
Dept: PRIMARY CARE CLINIC | Age: 71
End: 2021-07-29
Payer: MEDICARE

## 2021-07-29 DIAGNOSIS — Z01.818 PRE-OP TESTING: Primary | ICD-10-CM

## 2021-07-29 PROCEDURE — 93000 ELECTROCARDIOGRAM COMPLETE: CPT | Performed by: FAMILY MEDICINE

## 2021-07-30 ENCOUNTER — TELEPHONE (OUTPATIENT)
Dept: PRIMARY CARE CLINIC | Age: 71
End: 2021-07-30

## 2021-07-30 NOTE — TELEPHONE ENCOUNTER
Pt was in office for [place] for ekg to be sent to [address] [place] [address] for medications. ekg was faxed 5 times to the number we have and the fax rings busy.  Left msg for pt to call back to see if they have another fax number

## 2021-09-08 ENCOUNTER — TELEPHONE (OUTPATIENT)
Dept: PRIMARY CARE CLINIC | Age: 71
End: 2021-09-08

## 2021-09-08 DIAGNOSIS — Z94.2 LUNG TRANSPLANT STATUS, BILATERAL (HCC): Primary | ICD-10-CM

## 2021-09-08 NOTE — TELEPHONE ENCOUNTER
Pawel Ham calling from [facility] stating that patient has an appointment on 09/15/21 for Pulmonary and Critical Care with Dr. Anum Hill. Patient is in need of a referral prior to this appointment. Will you add?     [facility], [address] [place] [address],[address]

## 2021-09-09 ENCOUNTER — HOSPITAL ENCOUNTER (OUTPATIENT)
Age: 71
Discharge: HOME | End: 2021-09-09
Payer: MEDICARE

## 2021-09-09 DIAGNOSIS — Z94.2 LUNG TRANSPLANT STATUS, BILATERAL (HCC): ICD-10-CM

## 2021-09-09 LAB — SARS-COV-2, PCR: NORMAL

## 2021-09-09 PROCEDURE — 87206 SMEAR FLUORESCENT/ACID STAI: CPT

## 2021-09-09 PROCEDURE — 87070 CULTURE OTHR SPECIMN AEROBIC: CPT

## 2021-09-10 LAB
BORDETELLA PARAPERTUSSIS: NOT DETECTED
BORDETELLA PERTUSSIS BY PCR: NOT DETECTED
Lab: NOT DETECTED

## 2021-09-11 LAB
CULTURE, RESPIRATORY: NORMAL
SMEAR, RESPIRATORY: NORMAL

## 2021-10-27 ENCOUNTER — OFFICE VISIT (OUTPATIENT)
Dept: PRIMARY CARE CLINIC | Age: 71
End: 2021-10-27
Payer: MEDICARE

## 2021-10-27 VITALS
HEIGHT: 64 IN | WEIGHT: 193 LBS | HEART RATE: 90 BPM | BODY MASS INDEX: 32.95 KG/M2 | DIASTOLIC BLOOD PRESSURE: 70 MMHG | SYSTOLIC BLOOD PRESSURE: 132 MMHG | TEMPERATURE: 97.5 F | OXYGEN SATURATION: 98 %

## 2021-10-27 DIAGNOSIS — S51.011D SKIN TEAR OF RIGHT ELBOW WITHOUT COMPLICATION, SUBSEQUENT ENCOUNTER: ICD-10-CM

## 2021-10-27 DIAGNOSIS — M25.471 RIGHT ANKLE SWELLING: ICD-10-CM

## 2021-10-27 DIAGNOSIS — M25.512 CHRONIC LEFT SHOULDER PAIN: ICD-10-CM
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
## 2021-10-27 DIAGNOSIS — G89.29 CHRONIC LEFT SHOULDER PAIN: ICD-10-CM

## 2021-10-27 PROBLEM — N18.4 CKD (CHRONIC KIDNEY DISEASE) STAGE 4, GFR 15-29 ML/MIN (HCC): Status: RESOLVED | Noted: 2018-10-09 | Resolved: 2021-10-27
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
## 2021-10-27 PROCEDURE — G8484 FLU IMMUNIZE NO ADMIN: HCPCS | Performed by: FAMILY MEDICINE

## 2021-10-27 PROCEDURE — 4040F PNEUMOC VAC/ADMIN/RCVD: CPT | Performed by: FAMILY MEDICINE

## 2021-10-27 PROCEDURE — G8417 CALC BMI ABV UP PARAM F/U: HCPCS | Performed by: FAMILY MEDICINE

## 2021-10-27 PROCEDURE — 99214 OFFICE O/P EST MOD 30 MIN: CPT | Performed by: FAMILY MEDICINE

## 2021-10-27 PROCEDURE — 3017F COLORECTAL CA SCREEN DOC REV: CPT | Performed by: FAMILY MEDICINE

## 2021-10-27 PROCEDURE — 1036F TOBACCO NON-USER: CPT | Performed by: FAMILY MEDICINE

## 2021-10-27 PROCEDURE — 1123F ACP DISCUSS/DSCN MKR DOCD: CPT | Performed by: FAMILY MEDICINE

## 2021-10-27 PROCEDURE — G8427 DOCREV CUR MEDS BY ELIG CLIN: HCPCS | Performed by: FAMILY MEDICINE

## 2021-10-27 RX ORDER — ALBUTEROL SULFATE 2.5 MG/3ML
2.5 SOLUTION RESPIRATORY (INHALATION) EVERY 6 HOURS PRN
COMMUNITY
End: 2022-05-04

## 2021-10-27 RX ORDER — FAMOTIDINE 40 MG/1
40 TABLET, FILM COATED ORAL DAILY
COMMUNITY

## 2021-10-27 RX ORDER — ROSUVASTATIN CALCIUM 10 MG/1
10 TABLET, COATED ORAL DAILY
COMMUNITY
End: 2021-12-15

## 2021-10-27 RX ORDER — CLOPIDOGREL BISULFATE 75 MG/1
75 TABLET ORAL DAILY
COMMUNITY

## 2021-10-27 ASSESSMENT — ENCOUNTER SYMPTOMS
SHORTNESS OF BREATH: 0
CONSTIPATION: 0
BACK PAIN: 0
COUGH: 0
NAUSEA: 0
VOMITING: 0
DIARRHEA: 0
ABDOMINAL PAIN: 0
WHEEZING: 0

## 2021-10-27 NOTE — PROGRESS NOTES
10/27/21  Jane Armenta : 1950 Sex: male  Age: 66 y.o. Chief Complaint   Patient presents with    ED Follow-up     Fall x 2     HPI:  66 y.o. male presents today for ER follow up. Patient's chart, medical, surgical and medication history all reviewed. ER follow up  Date seen in the ER: 21 for fall- subsequent fall last week  Symptoms: Fall, hematoma R thigh  Labs/Imaging: XR hip, pelvis, CT pelvis- soft tissue hematoma right lateral thight  Diagnosis: Fall, Skin abrasion   Treatments: None    Patient states that the fall was mechanical.  Tripped over uneven ground. Has can/walkers at home but chooses to not use them. Still with some bruising/swelling RLE, but getting around better at this time. Shoulder Pain   Patient complaints of left acromioclavicular joint shoulder pain. The pain is described as aching and numbing. The pain has been present for several years. The pain occurs at random and lasts briefly. No history of dislocation. Patient has numbness/tingling which does not radiate. Symptoms are aggravated by unknown cause- occurs at random. Symptoms are diminished by  rest.       Limited activities include: no limitations. No stiffness, no weakness, no swelling is reported. ROS:  Review of Systems   Constitutional: Negative for chills, fatigue and fever. Respiratory: Negative for cough, shortness of breath and wheezing. Cardiovascular: Positive for leg swelling (R ankle). Negative for chest pain and palpitations. Gastrointestinal: Negative for abdominal pain, constipation, diarrhea, nausea and vomiting. Musculoskeletal: Positive for arthralgias (L shoulder). Negative for back pain. Skin: Positive for rash (under chin). Neurological: Negative for dizziness and headaches. Hematological: Bruises/bleeds easily. Psychiatric/Behavioral: Positive for sleep disturbance. Negative for dysphoric mood. The patient is nervous/anxious.     All other systems reviewed and are negative.      Current Outpatient Medications on File Prior to Visit   Medication Sig Dispense Refill    famotidine (PEPCID) 40 MG tablet Take 40 mg by mouth daily      albuterol (PROVENTIL) (2.5 MG/3ML) 0.083% nebulizer solution Take 2.5 mg by nebulization every 6 hours as needed for Wheezing      clopidogrel (PLAVIX) 75 MG tablet Take 75 mg by mouth daily      rosuvastatin (CRESTOR) 10 MG tablet Take 10 mg by mouth daily      SPS 15 GM/60ML suspension TAKE 60 ML ON SUNDAY FOR 4 WEEKS      ondansetron (ZOFRAN) 4 MG tablet Take 4 mg by mouth every 8 hours as needed      midodrine (PROAMATINE) 10 MG tablet TAKE 1 TABLET BY MOUTH EVERY MONDAY WEDNESDAY  AND FRIDAY BEFORE DIALYSIS      calcitRIOL (ROCALTROL) 0.25 MCG capsule Calcitriol Calcitriol (Rocaltrol) 0.25 MCG Capsule Active 0.25 MCG PO Daily February 5th, 2021 8:49pm 02-  [facility] ([id number])      FLUoxetine (PROZAC) 40 MG capsule TAKE ONE CAPSULE BY MOUTH DAILY 90 capsule 1    acetaminophen (TYLENOL) 325 MG tablet Take 650 mg by mouth every 6 hours as needed for Pain      benzonatate (TESSALON) 100 MG capsule Take 100 mg by mouth 3 times daily as needed for Cough      calcium acetate 667 MG TABS Take 667 mg by mouth every 12 hours      vitamin D 25 MCG (1000 UT) CAPS Take 2,000 Units by mouth daily      sodium chloride, Inhalant, 7 % nebulizer solution Take 4 mLs by nebulization as needed for Other      sulfamethoxazole-trimethoprim (BACTRIM;SEPTRA) 400-80 MG per tablet Take 1 tablet by mouth      isosorbide dinitrate (ISORDIL) 10 MG tablet Take 5 mg by mouth 3 times daily      folic acid (FOLVITE) 597 MCG tablet Take 400 mcg by mouth daily      aspirin 81 MG tablet Take 81 mg by mouth daily      fluticasone (FLONASE) 50 MCG/ACT nasal spray 1 spray by Each Nostril route daily      cycloSPORINE (SANDIMMUNE) 100 MG capsule Take 100 mg by mouth 2 times daily      cycloSPORINE (SANDIMMUNE) 25 MG capsule Take 25 mg by mouth daily      valGANciclovir (VALCYTE) 450 MG tablet Take 450 mg by mouth      calcium carbonate (TUMS) 500 MG chewable tablet Take 2 tablets by mouth daily      predniSONE (DELTASONE) 5 MG tablet Take 5 mg by mouth daily      nitroGLYCERIN (NITROSTAT) 0.4 MG SL tablet Place 0.4 mg under the tongue every 5 minutes as needed for Chest pain up to max of 3 total doses. If no relief after 1 dose, call 911.  guaiFENesin 400 MG tablet Take 400 mg by mouth 4 times daily as needed for Cough      Simethicone 40 MG STRP Take by mouth      Multiple Vitamins-Minerals (THERAPEUTIC MULTIVITAMIN-MINERALS) tablet Take 1 tablet by mouth daily       No current facility-administered medications on file prior to visit.        Allergies   Allergen Reactions    Ceftriaxone Itching     Itching at IV site & red streak along the arm within 5 minutes of infusion      Heparin Other (See Comments)     MEÑO      Lorazepam Other (See Comments)    Tacrolimus Other (See Comments)     Agitation         Past Medical History:   Diagnosis Date    Allergic rhinitis     CAD (coronary artery disease)     Ishcemia CABS X 2 4/20/17 [facility]    COPD (chronic obstructive pulmonary disease) (HCC)     O2 3L [facility]    Emphysema (subcutaneous) (surgical) resulting from a procedure     Upper Lobes    Emphysema lung ([address]     GERD (gastroesophageal reflux disease)     Hyperlipidemia     Hypertension     IFG (impaired fasting glucose)     Obesity     Pulmonary fibrosis (HCC)     Benign Nodules - restrictive    Vitamin D insufficiency      Past Surgical History:   Procedure Laterality Date    APPENDECTOMY      BRONCHOSCOPY  05/12/2017    Bedside - 6/18/18 - [place] - [facility] - 8/13/18    CABG WITH AORTIC VALVE REPAIR  04/20/2017    X 2 [facility]    CATARACT REMOVAL WITH IMPLANT Bilateral     OS 11/7/18, OD 11/19/18 - [place]    CHOLECYSTECTOMY      DIAPHRAGM SURGERY Left 08/11/2017    LUNG DECORTICATION      LUNG TRANSPLANT, DOUBLE 10/27/21 1040   BP: 132/70   Pulse: 90   Temp: 97.5 °F (36.4 °C)   SpO2: 98%   Weight: 193 lb (87.5 kg)   Height: 5' 4\" (1.626 m)       Physical Exam:  Physical Exam  Vitals and nursing note reviewed. Constitutional:       General: He is not in acute distress. Appearance: Normal appearance. He is well-developed. He is obese. He is ill-appearing (chronically). HENT:      Head: Normocephalic and atraumatic. Right Ear: Hearing and external ear normal.      Left Ear: Hearing and external ear normal.      Nose:      Comments: Patient wearing mask  Eyes:      General: Lids are normal. No scleral icterus. Extraocular Movements: Extraocular movements intact. Conjunctiva/sclera: Conjunctivae normal.   Neck:      Thyroid: No thyromegaly. Cardiovascular:      Rate and Rhythm: Normal rate and regular rhythm. Heart sounds: Normal heart sounds. No murmur heard. Pulmonary:      Effort: Pulmonary effort is normal. No respiratory distress. Breath sounds: Normal breath sounds. No wheezing. Musculoskeletal:         General: No tenderness or deformity. Normal range of motion. Cervical back: Normal range of motion and neck supple. Right lower leg: Edema (1+ pitting to ankle) present. Left lower leg: No edema. Lymphadenopathy:      Cervical: No cervical adenopathy. Skin:     General: Skin is warm and dry. Findings: Bruising (R ankle) and rash (various papules noted under chin- patient states that derm told him they were warts) present. Neurological:      General: No focal deficit present. Mental Status: He is alert and oriented to person, place, and time. Gait: Gait normal.   Psychiatric:         Mood and Affect: Mood and affect normal.         Speech: Speech normal.         Behavior: Behavior normal.         Thought Content:  Thought content normal.         Labs:  CBC with Differential:    Lab Results   Component Value Date    WBC 7.5 08/24/2021    RBC 3.08 08/24/2021    HGB 10.8 08/24/2021    HCT 31.7 08/24/2021     08/24/2021    .1 08/24/2021    MCH 35.0 08/24/2021    MCHC 34.0 08/24/2021    RDW 15.7 08/24/2021    SEGSPCT 64.2 08/24/2021    LYMPHOPCT 28.4 08/24/2021    MONOPCT 5.3 08/24/2021    BASOPCT 1.2 08/24/2021    MONOSABS 0.4 08/24/2021    LYMPHSABS 2.1 08/24/2021    EOSABS 0.1 08/24/2021    BASOSABS 0.1 08/24/2021     CMP:    Lab Results   Component Value Date     08/24/2021    K 4.5 08/24/2021    CL 97 08/24/2021    CO2 27 08/24/2021    BUN 22 08/24/2021    CREATININE 3.4 08/24/2021    GFRAA 23 04/07/2020    AGRATIO 1.0 08/24/2021    LABGLOM 18 08/24/2021    GLUCOSE 126 08/24/2021    PROT 7.0 08/24/2021    LABALBU 3.5 08/24/2021    CALCIUM 8.3 08/24/2021    BILITOT 1.1 08/24/2021    ALKPHOS 109 08/24/2021    AST 30 08/24/2021    ALT 26 08/24/2021     U/A:  No results found for: NITRITE, COLORU, PROTEINU, PHUR, LABCAST, [address], RBCUA, MUCUS, TRICHOMONAS, YEAST, BACTERIA, CLARITYU, SPECGRAV, LEUKOCYTESUR, UROBILINOGEN, BILIRUBINUR, BLOODU, GLUCOSEU, AMORPHOUS  HgBA1c:    Lab Results   Component Value Date    LABA1C 5.1 11/16/2020     Microalbumen/Creatinine ratio:  No components found for: RUCREAT  FLP:  No results found for: TRIG, HDL, LDLCALC, LDLDIRECT, LABVLDL  TSH:  No results found for: TSH  PSA: No results found for: PSA       Assessment and Plan:  Mary Rosa was seen today for ed follow-up. Diagnoses and all orders for this visit:    Fall, subsequent encounter  Discussed using cane/walker just as added protection from falls. Heat over hematoma to resolve. Ambulating well today. Skin tear of right elbow without complication, subsequent encounter  Resolving    Right ankle swelling  Discussed compression and elevated. Not pathologic    Chronic left shoulder pain  -     XR SHOULDER LEFT (MIN 2 VIEWS); Future  -     External Referral To Orthopedic Surgery  Suspect AC OA. Check xray. Will send to ortho for injection.   Had good relief with injection in March 2021. Return in about 5 weeks (around 12/1/2021) for AWV.       Seen By:  Nish Rivera, DO
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
## 2021-11-30 ENCOUNTER — NURSE TRIAGE (OUTPATIENT)
Dept: OTHER | Facility: CLINIC | Age: 71
End: 2021-11-30

## 2021-11-30 NOTE — TELEPHONE ENCOUNTER
Received call from [address] [place] [address] at [facility], caller not on line. Complaint: Weakness and off balance    Market: [address] Name: [facility]    Caller's telephone number verified as [phone]    Connected with caller's wife, see below. Brief description of triage:Returned call to pt's wife; Per wife she called to change patients wellness appt on 12/1 but noted to [facility] that patient has been experiencing weakness and off balance. Patient is not present at dialysis. No triage. Care advice provided, patient verbalizes understanding; denies any other questions or concerns; instructed to call back for any new or worsening symptoms. Writer provided warm transfer to [place] at PCP office for appointment scheduling. Reason for Disposition   NON-URGENT call redirected to PCP's office because it is open    Protocols used: NO CONTACT OR DUPLICATE CONTACT CALL-ADULT-    Attention Provider: Thank you for allowing me to participate in the care of your patient. The patient was connected to triage in response to information provided to the ECC/PSC. Please do not respond through this encounter as the response is not directed to a shared pool.

## 2021-12-02 ENCOUNTER — OFFICE VISIT (OUTPATIENT)
Dept: PRIMARY CARE CLINIC | Age: 71
End: 2021-12-02
Payer: MEDICARE

## 2021-12-02 VITALS
WEIGHT: 192 LBS | HEIGHT: 64 IN | BODY MASS INDEX: 32.78 KG/M2 | OXYGEN SATURATION: 93 % | DIASTOLIC BLOOD PRESSURE: 64 MMHG | HEART RATE: 97 BPM | TEMPERATURE: 97.9 F | SYSTOLIC BLOOD PRESSURE: 128 MMHG

## 2021-12-02 DIAGNOSIS — I10 PRIMARY HYPERTENSION: Primary | ICD-10-CM

## 2021-12-02 DIAGNOSIS — Z99.2 ANEMIA DUE TO CHRONIC KIDNEY DISEASE, ON CHRONIC DIALYSIS (HCC): ICD-10-CM

## 2021-12-02 DIAGNOSIS — R53.1 WEAKNESS: ICD-10-CM

## 2021-12-02 DIAGNOSIS — Z94.2 LUNG TRANSPLANT STATUS, BILATERAL (HCC): ICD-10-CM

## 2021-12-02 DIAGNOSIS — E78.2 MIXED HYPERLIPIDEMIA: ICD-10-CM

## 2021-12-02 DIAGNOSIS — N18.6 ANEMIA DUE TO CHRONIC KIDNEY DISEASE, ON CHRONIC DIALYSIS (HCC): ICD-10-CM

## 2021-12-02 DIAGNOSIS — Z91.81 AT HIGH RISK FOR FALLS: ICD-10-CM

## 2021-12-02 DIAGNOSIS — Z99.2 STAGE 5 CHRONIC KIDNEY DISEASE ON DIALYSIS (HCC): ICD-10-CM

## 2021-12-02 DIAGNOSIS — Z99.2 DEPENDENCE ON RENAL DIALYSIS (HCC): ICD-10-CM

## 2021-12-02 DIAGNOSIS — N18.6 STAGE 5 CHRONIC KIDNEY DISEASE ON DIALYSIS (HCC): ICD-10-CM

## 2021-12-02 DIAGNOSIS — D63.1 ANEMIA DUE TO CHRONIC KIDNEY DISEASE, ON CHRONIC DIALYSIS (HCC): ICD-10-CM

## 2021-12-02 DIAGNOSIS — R06.02 SHORTNESS OF BREATH: ICD-10-CM

## 2021-12-02 DIAGNOSIS — I50.32 CHRONIC DIASTOLIC CHF (CONGESTIVE HEART FAILURE) (HCC): ICD-10-CM

## 2021-12-02 PROCEDURE — 1123F ACP DISCUSS/DSCN MKR DOCD: CPT | Performed by: FAMILY MEDICINE

## 2021-12-02 PROCEDURE — 1036F TOBACCO NON-USER: CPT | Performed by: FAMILY MEDICINE

## 2021-12-02 PROCEDURE — 99214 OFFICE O/P EST MOD 30 MIN: CPT | Performed by: FAMILY MEDICINE

## 2021-12-02 PROCEDURE — G8484 FLU IMMUNIZE NO ADMIN: HCPCS | Performed by: FAMILY MEDICINE

## 2021-12-02 PROCEDURE — 4040F PNEUMOC VAC/ADMIN/RCVD: CPT | Performed by: FAMILY MEDICINE

## 2021-12-02 PROCEDURE — G8417 CALC BMI ABV UP PARAM F/U: HCPCS | Performed by: FAMILY MEDICINE

## 2021-12-02 PROCEDURE — G8427 DOCREV CUR MEDS BY ELIG CLIN: HCPCS | Performed by: FAMILY MEDICINE

## 2021-12-02 PROCEDURE — 3017F COLORECTAL CA SCREEN DOC REV: CPT | Performed by: FAMILY MEDICINE

## 2021-12-02 RX ORDER — CYCLOSPORINE 100 MG/1
CAPSULE, LIQUID FILLED ORAL
COMMUNITY
Start: 2021-11-07 | End: 2022-05-04 | Stop reason: SDUPTHER

## 2021-12-02 RX ORDER — ROSUVASTATIN CALCIUM 5 MG/1
TABLET, COATED ORAL
COMMUNITY
Start: 2021-11-01

## 2021-12-02 NOTE — PROGRESS NOTES
21  Juan Duenas : 1950 Sex: male  Age: 66 y.o. Chief Complaint   Patient presents with    Shortness of Breath    Other     off balance - dizzy     HPI:  66 y.o. male presents today for 6 month(s) follow up of chronic medical conditions. Patient's chart, medical, surgical and medication history all reviewed. Patient following with [facility] for status post lung transplant therapies. Now on waiting list for kidney transplants. Undergoing dialysis currently. Today, he notes that he has been having more difficulty with weakness and near falls. Unsteady gait. Feels more short of breath and worried that he might be rejecting his transplant. Hypertension   The patient presents today for follow up of HTN. The problem is well controlled. Risk factors for coronary artery disease include Age > 27, male and HTN. Current treatments include Isordil. Previously on Metoprolol and Hydralazine that have been stopped. The patient is compliant all of the time. Lifestyle changes the patient has made include none. Today the patient is complaining of none. Anxiety  Patient complains of evaluation of anxiety disorder and panic attacks. He has the following anxiety symptoms: feelings of losing control, irritable, racing thoughts. Onset of symptoms was approximately several years ago, gradually worsening since that time. He denies current suicidal and homicidal ideation. Risk factors: negative life event - patient has been under increasing stress due to chronic medical conditions. Has had a lung transplant and is now waiting for kidney transplant. Having panic attacks prior to HD. Previous treatment includes Ativan, BuSpar, Prozac, Xanax, Zoloft and Zyprexa and no therapy. He complains of the following side effects from the treatment: hallucinations with benzos. ROS:  Review of Systems   Constitutional: Negative for chills, fatigue and fever.    Respiratory: Positive for shortness of breath. Negative for cough and wheezing. Cardiovascular: Negative for chest pain, palpitations and leg swelling. Gastrointestinal: Negative for abdominal pain, constipation, diarrhea, nausea and vomiting. Musculoskeletal: Positive for gait problem. Negative for arthralgias and back pain. Skin: Negative for rash. Neurological: Positive for weakness. Negative for dizziness and headaches. Hematological: Bruises/bleeds easily. Psychiatric/Behavioral: Positive for sleep disturbance. Negative for dysphoric mood. The patient is nervous/anxious. All other systems reviewed and are negative.         Current Outpatient Medications on File Prior to Visit   Medication Sig Dispense Refill    rosuvastatin (CRESTOR) 5 MG tablet TAKE ONE TABLET BY MOUTH EVERY DAY      famotidine (PEPCID) 40 MG tablet Take 40 mg by mouth daily      albuterol (PROVENTIL) (2.5 MG/3ML) 0.083% nebulizer solution Take 2.5 mg by nebulization every 6 hours as needed for Wheezing      clopidogrel (PLAVIX) 75 MG tablet Take 75 mg by mouth daily      rosuvastatin (CRESTOR) 10 MG tablet Take 10 mg by mouth daily      SPS 15 GM/60ML suspension TAKE 60 ML ON SUNDAY FOR 4 WEEKS      ondansetron (ZOFRAN) 4 MG tablet Take 4 mg by mouth every 8 hours as needed      midodrine (PROAMATINE) 10 MG tablet TAKE 1 TABLET BY MOUTH EVERY MONDAY WEDNESDAY  AND FRIDAY BEFORE DIALYSIS      calcitRIOL (ROCALTROL) 0.25 MCG capsule Calcitriol Calcitriol (Rocaltrol) 0.25 MCG Capsule Active 0.25 MCG PO Daily February 5th, 2021 8:49pm 02-  [facility] ([id number])      FLUoxetine (PROZAC) 40 MG capsule TAKE ONE CAPSULE BY MOUTH DAILY 90 capsule 1    acetaminophen (TYLENOL) 325 MG tablet Take 650 mg by mouth every 6 hours as needed for Pain      benzonatate (TESSALON) 100 MG capsule Take 100 mg by mouth 3 times daily as needed for Cough      calcium acetate 667 MG TABS Take 667 mg by mouth every 12 hours      vitamin D 25 MCG (1000 UT) CAPS Take 2,000 Units by mouth daily      sodium chloride, Inhalant, 7 % nebulizer solution Take 4 mLs by nebulization as needed for Other      sulfamethoxazole-trimethoprim (BACTRIM;SEPTRA) 400-80 MG per tablet Take 1 tablet by mouth      isosorbide dinitrate (ISORDIL) 10 MG tablet Take 5 mg by mouth 3 times daily      folic acid (FOLVITE) 743 MCG tablet Take 400 mcg by mouth daily      aspirin 81 MG tablet Take 81 mg by mouth daily      fluticasone (FLONASE) 50 MCG/ACT nasal spray 1 spray by Each Nostril route daily      cycloSPORINE (SANDIMMUNE) 100 MG capsule Take 100 mg by mouth 2 times daily      cycloSPORINE (SANDIMMUNE) 25 MG capsule Take 25 mg by mouth daily      valGANciclovir (VALCYTE) 450 MG tablet Take 450 mg by mouth      calcium carbonate (TUMS) 500 MG chewable tablet Take 2 tablets by mouth daily      predniSONE (DELTASONE) 5 MG tablet Take 5 mg by mouth daily      nitroGLYCERIN (NITROSTAT) 0.4 MG SL tablet Place 0.4 mg under the tongue every 5 minutes as needed for Chest pain up to max of 3 total doses. If no relief after 1 dose, call 911.  guaiFENesin 400 MG tablet Take 400 mg by mouth 4 times daily as needed for Cough      Simethicone 40 MG STRP Take by mouth      Multiple Vitamins-Minerals (THERAPEUTIC MULTIVITAMIN-MINERALS) tablet Take 1 tablet by mouth daily      cycloSPORINE modified (NEORAL) 100 MG capsule TAKE ONE CAPSULE BY MOUTH TWO TIMES A DAY       No current facility-administered medications on file prior to visit.        Allergies   Allergen Reactions    Ceftriaxone Itching     Itching at IV site & red streak along the arm within 5 minutes of infusion      Heparin Other (See Comments)     MEÑO      Lorazepam Other (See Comments)    Tacrolimus Other (See Comments)     Agitation         Past Medical History:   Diagnosis Date    Allergic rhinitis     CAD (coronary artery disease)     Ishcemia CABS X 2 4/20/17 [facility]    COPD (chronic obstructive pulmonary disease) ([facility] [address]     O2 3L [facility]    Emphysema (subcutaneous) (surgical) resulting from a procedure     Upper Lobes    Emphysema lung (HCC)     GERD (gastroesophageal reflux disease)     Hyperlipidemia     Hypertension     IFG (impaired fasting glucose)     Obesity     Pulmonary fibrosis (HCC)     Benign Nodules - restrictive    Vitamin D insufficiency      Past Surgical History:   Procedure Laterality Date    APPENDECTOMY      BRONCHOSCOPY  2017    Bedside - 18 - [facility] - [facility] - 18    CABG WITH AORTIC VALVE REPAIR  04/20/2017    X 2 [facility]    CATARACT REMOVAL WITH IMPLANT Bilateral     OS 18, OD 18 - [place]    CHOLECYSTECTOMY      DIAPHRAGM SURGERY Left 2017    LUNG DECORTICATION      LUNG TRANSPLANT, DOUBLE  2017    LUNG TRANSPLANT, DOUBLE      THORACOTOMY      TRACHEOSTOMY  2017    VOCAL CORD SURGERY      Nodules, Skin Lesions - [facility]     Family History   Problem Relation Age of Onset    Heart Failure Mother     Other Father         AAA    Cancer Brother         Lung    Coronary Art Dis Brother     Heart Failure Brother         Methothelioma     Social History     Socioeconomic History    Marital status:      Spouse name: Not on file    Number of children: Not on file    Years of education: Not on file    Highest education level: Not on file   Occupational History    Not on file   Tobacco Use    Smoking status: Former Smoker     Packs/day: 2.00     Years: 35.00     Pack years: 70.00     Quit date: 1997     Years since quittin.2    Smokeless tobacco: Never Used    Tobacco comment: Quit 18 years ago   Substance and Sexual Activity    Alcohol use: Never    Drug use: Never    Sexual activity: Not on file   Other Topics Concern    Not on file   Social History Narrative    Not on file     Social Determinants of Health     Financial Resource Strain:     Difficulty of Paying Living Expenses: Not on file   Food Insecurity:     Worried About [facility] in the Last Year: Not on file    [place] of Food in the Last Year: Not on file   Transportation Needs:     Lack of Transportation (Medical): Not on file    Lack of Transportation (Non-Medical): Not on file   Physical Activity:     Days of Exercise per Week: Not on file    Minutes of Exercise per Session: Not on file   Stress:     Feeling of Stress : Not on file   Social Connections:     Frequency of Communication with Friends and Family: Not on file    Frequency of Social Gatherings with Friends and Family: Not on file    Attends Worship Services: Not on file    Active Member of [address] [place] [address] [employer] or Organizations: Not on file    Attends Club or Organization Meetings: Not on file    Marital Status: Not on file   Intimate Partner Violence:     Fear of Current or Ex-Partner: Not on file    Emotionally Abused: Not on file    Physically Abused: Not on file    Sexually Abused: Not on file   Housing Stability:     Unable to Pay for Housing in the Last Year: Not on file    Number of [place] in the Last Year: Not on file    Unstable Housing in the Last Year: Not on file       Vitals:    12/02/21 1620   BP: 128/64   Pulse: 97   Temp: 97.9 °F (36.6 °C)   SpO2: 93%   Weight: 192 lb (87.1 kg)   Height: 5' 4\" (1.626 m)       Physical Exam:  Physical Exam  Vitals and nursing note reviewed. Constitutional:       General: He is not in acute distress. Appearance: Normal appearance. He is well-developed. He is obese. He is ill-appearing (chronically). HENT:      Head: Normocephalic and atraumatic. Right Ear: Hearing and external ear normal.      Left Ear: Hearing and external ear normal.      Nose:      Comments: Patient wearing mask  Eyes:      General: Lids are normal. No scleral icterus. Extraocular Movements: Extraocular movements intact. Conjunctiva/sclera: Conjunctivae normal.   Neck:      Thyroid: No thyromegaly.    Cardiovascular:      Rate and Rhythm: Normal rate and regular rhythm. Heart sounds: Normal heart sounds. No murmur heard. Pulmonary:      Effort: Pulmonary effort is normal. No respiratory distress. Breath sounds: Normal breath sounds. No wheezing. Musculoskeletal:         General: No tenderness or deformity. Normal range of motion. Cervical back: Normal range of motion and neck supple. Right lower leg: No edema. Left lower leg: No edema. Lymphadenopathy:      Cervical: No cervical adenopathy. Skin:     General: Skin is warm and dry. Findings: No rash. Neurological:      General: No focal deficit present. Mental Status: He is alert and oriented to person, place, and time. Gait: Gait normal.   Psychiatric:         Mood and Affect: Mood and affect normal.         Speech: Speech normal.         Behavior: Behavior normal.         Thought Content:  Thought content normal.           Labs:  CBC with Differential:    Lab Results   Component Value Date    WBC 7.5 08/24/2021    RBC 3.08 08/24/2021    HGB 10.8 08/24/2021    HCT 31.7 08/24/2021     08/24/2021    .1 08/24/2021    MCH 35.0 08/24/2021    MCHC 34.0 08/24/2021    RDW 15.7 08/24/2021    SEGSPCT 64.2 08/24/2021    LYMPHOPCT 28.4 08/24/2021    MONOPCT 5.3 08/24/2021    BASOPCT 1.2 08/24/2021    MONOSABS 0.4 08/24/2021    LYMPHSABS 2.1 08/24/2021    EOSABS 0.1 08/24/2021    BASOSABS 0.1 08/24/2021     CMP:    Lab Results   Component Value Date     08/24/2021    K 4.5 08/24/2021    CL 97 08/24/2021    CO2 27 08/24/2021    BUN 22 08/24/2021    CREATININE 3.4 08/24/2021    GFRAA 23 04/07/2020    AGRATIO 1.0 08/24/2021    LABGLOM 18 08/24/2021    GLUCOSE 126 08/24/2021    PROT 7.0 08/24/2021    LABALBU 3.5 08/24/2021    CALCIUM 8.3 08/24/2021    BILITOT 1.1 08/24/2021    ALKPHOS 109 08/24/2021    AST 30 08/24/2021    ALT 26 08/24/2021     HgBA1c:    Lab Results   Component Value Date    LABA1C 5.1 11/16/2020     FLP:  No results found for: TRIG, HDL, LDLCALC, LDLDIRECT, LABVLDL  TSH:  No results found for: TSH  PSA: No results found for: PSA       Assessment and Plan:  Radha Craft was seen today for shortness of breath and other. Diagnoses and all orders for this visit:    Primary hypertension  Well controlled. Anemia due to chronic kidney disease, on chronic dialysis ([facility] [address]  Suspect major reason for patient's fatigue, SOB, and weakness. Getting infusions. Stage 5 chronic kidney disease on dialysis ([address]    Dependence on renal dialysis ([facility] [address]    Lung transplant status, bilateral (HCC)  -     XR CHEST (2 VW); Future    Shortness of breath  -     XR CHEST (2 VW); Future  Lungs sound clear, but will check CXR to rule out early disease    Weakness    Mixed hyperlipidemia    Chronic diastolic CHF (congestive heart failure) (HCC)    At high risk for falls  On the basis of positive falls risk screening, assessment and plan is as follows: patient will use walker temporarily and see if symptoms improve with increasing Hb. Return in about 4 weeks (around 12/30/2021), or if symptoms worsen or fail to improve, for AWV.       Seen By:  Darcy Espana,

## 2021-12-03 ASSESSMENT — ENCOUNTER SYMPTOMS
WHEEZING: 0
VOMITING: 0
CONSTIPATION: 0
SHORTNESS OF BREATH: 1
ABDOMINAL PAIN: 0
DIARRHEA: 0
NAUSEA: 0
BACK PAIN: 0
COUGH: 0

## 2021-12-15 ENCOUNTER — OFFICE VISIT (OUTPATIENT)
Dept: PRIMARY CARE CLINIC | Age: 71
End: 2021-12-15
Payer: MEDICARE

## 2021-12-15 VITALS
DIASTOLIC BLOOD PRESSURE: 70 MMHG | SYSTOLIC BLOOD PRESSURE: 130 MMHG | TEMPERATURE: 97.8 F | WEIGHT: 194.4 LBS | BODY MASS INDEX: 33.19 KG/M2 | OXYGEN SATURATION: 94 % | HEIGHT: 64 IN | HEART RATE: 97 BPM

## 2021-12-15 DIAGNOSIS — Z00.00 ROUTINE GENERAL MEDICAL EXAMINATION AT A HEALTH CARE FACILITY: Primary | ICD-10-CM

## 2021-12-15 PROCEDURE — G0439 PPPS, SUBSEQ VISIT: HCPCS | Performed by: FAMILY MEDICINE

## 2021-12-15 PROCEDURE — 1123F ACP DISCUSS/DSCN MKR DOCD: CPT | Performed by: FAMILY MEDICINE

## 2021-12-15 PROCEDURE — G8484 FLU IMMUNIZE NO ADMIN: HCPCS | Performed by: FAMILY MEDICINE

## 2021-12-15 PROCEDURE — 3017F COLORECTAL CA SCREEN DOC REV: CPT | Performed by: FAMILY MEDICINE

## 2021-12-15 PROCEDURE — 4040F PNEUMOC VAC/ADMIN/RCVD: CPT | Performed by: FAMILY MEDICINE

## 2021-12-15 RX ORDER — PANTOPRAZOLE SODIUM 40 MG/1
TABLET, DELAYED RELEASE ORAL
COMMUNITY
Start: 2021-11-29

## 2021-12-15 RX ORDER — MIRTAZAPINE 15 MG/1
TABLET, ORALLY DISINTEGRATING ORAL
COMMUNITY
Start: 2021-12-05

## 2021-12-15 ASSESSMENT — PATIENT HEALTH QUESTIONNAIRE - PHQ9
1. LITTLE INTEREST OR PLEASURE IN DOING THINGS: 1
SUM OF ALL RESPONSES TO PHQ9 QUESTIONS 1 & 2: 1
SUM OF ALL RESPONSES TO PHQ QUESTIONS 1-9: 1
SUM OF ALL RESPONSES TO PHQ QUESTIONS 1-9: 1
2. FEELING DOWN, DEPRESSED OR HOPELESS: 0
SUM OF ALL RESPONSES TO PHQ QUESTIONS 1-9: 1

## 2021-12-15 ASSESSMENT — LIFESTYLE VARIABLES
AUDIT TOTAL SCORE: INCOMPLETE
AUDIT-C TOTAL SCORE: INCOMPLETE
HOW OFTEN DO YOU HAVE A DRINK CONTAINING ALCOHOL: 0
HOW OFTEN DO YOU HAVE A DRINK CONTAINING ALCOHOL: NEVER

## 2021-12-15 NOTE — PATIENT INSTRUCTIONS
Personalized Preventive Plan for George Kemp - 12/15/2021  Medicare offers a range of preventive health benefits. Some of the tests and screenings are paid in full while other may be subject to a deductible, co-insurance, and/or copay. Some of these benefits include a comprehensive review of your medical history including lifestyle, illnesses that may run in your family, and various assessments and screenings as appropriate. After reviewing your medical record and screening and assessments performed today your provider may have ordered immunizations, labs, imaging, and/or referrals for you. A list of these orders (if applicable) as well as your Preventive Care list are included within your After Visit Summary for your review. Other Preventive Recommendations:    · A preventive eye exam performed by an eye specialist is recommended every 1-2 years to screen for glaucoma; cataracts, macular degeneration, and other eye disorders. · A preventive dental visit is recommended every 6 months. · Try to get at least 150 minutes of exercise per week or 10,000 steps per day on a pedometer . · Order or download the FREE \"Exercise & Physical Activity: Your Everyday Guide\" from [employer]. Call 6-970.412.2405 or search [employer] online. · You need 8028-2429 mg of calcium and 0533-3787 IU of vitamin D per day. It is possible to meet your calcium requirement with diet alone, but a vitamin D supplement is usually necessary to meet this goal.  · When exposed to the sun, use a sunscreen that protects against both UVA and UVB radiation with an SPF of 30 or greater. Reapply every 2 to 3 hours or after sweating, drying off with a towel, or swimming. · Always wear a seat belt when traveling in a car. Always wear a helmet when riding a bicycle or motorcycle.

## 2021-12-15 NOTE — PROGRESS NOTES
Medicare Annual Wellness Visit  Name: Spencer eMndiola Date: 12/15/2021   MRN: [id number] Sex: Male   Age: 66 y.o. Ethnicity: Non- / Non    : 1950 Race: White (non-)      Major Filler is here for Medicare AWV    Screenings for behavioral, psychosocial and functional/safety risks, and cognitive dysfunction are all negative except as indicated below. These results, as well as other patient data from the [address] form, are documented in Flowsheets linked to this Encounter. Allergies   Allergen Reactions    Ceftriaxone Itching     Itching at IV site & red streak along the arm within 5 minutes of infusion      Heparin Other (See Comments)     MEÑO      Lorazepam Other (See Comments)    Tacrolimus Other (See Comments)     Agitation           Prior to Visit Medications    Medication Sig Taking?  Authorizing Provider   pantoprazole (PROTONIX) 40 MG tablet TAKE ONE TABLET BY MOUTH TWO TIMES A DAY BEFORE MEALS (6AM AND 4PM) Yes Historical Provider, MD   rosuvastatin (CRESTOR) 5 MG tablet TAKE ONE TABLET BY MOUTH EVERY DAY Yes Historical Provider, MD   cycloSPORINE modified (NEORAL) 100 MG capsule TAKE ONE CAPSULE BY MOUTH TWO TIMES A DAY Yes Historical Provider, MD   famotidine (PEPCID) 40 MG tablet Take 40 mg by mouth daily Yes Historical Provider, MD   albuterol (PROVENTIL) (2.5 MG/3ML) 0.083% nebulizer solution Take 2.5 mg by nebulization every 6 hours as needed for Wheezing Yes Historical Provider, MD   clopidogrel (PLAVIX) 75 MG tablet Take 75 mg by mouth daily Yes Historical Provider, MD   SPS 15 GM/60ML suspension TAKE 60 ML ON  FOR 4 WEEKS Yes Historical Provider, MD   ondansetron (ZOFRAN) 4 MG tablet Take 4 mg by mouth every 8 hours as needed Yes Historical Provider, MD   midodrine (PROAMATINE) 10 MG tablet TAKE 1 TABLET BY MOUTH EVERY   AND FRIDAY BEFORE DIALYSIS Yes Historical Provider, MD   calcitRIOL (ROCALTROL) 0.25 MCG capsule Calcitriol Calcitriol (Rocaltrol) 0.25 MCG Capsule Active 0.25 MCG PO Daily February 5th, 2021 8:49pm 02-  [facility] [place] ([id number]) Yes Historical Provider, MD   FLUoxetine (PROZAC) 40 MG capsule TAKE ONE CAPSULE BY MOUTH DAILY Yes Warren Cure, DO   acetaminophen (TYLENOL) 325 MG tablet Take 650 mg by mouth every 6 hours as needed for Pain Yes Historical Provider, MD   benzonatate (TESSALON) 100 MG capsule Take 100 mg by mouth 3 times daily as needed for Cough Yes Historical Provider, MD   calcium acetate 667 MG TABS Take 667 mg by mouth every 12 hours Yes Historical Provider, MD   vitamin D 25 MCG (1000 UT) CAPS Take 2,000 Units by mouth daily Yes Historical Provider, MD   sodium chloride, Inhalant, 7 % nebulizer solution Take 4 mLs by nebulization as needed for Other Yes Historical Provider, MD   sulfamethoxazole-trimethoprim (BACTRIM;SEPTRA) 400-80 MG per tablet Take 1 tablet by mouth Yes Historical Provider, MD   isosorbide dinitrate (ISORDIL) 10 MG tablet Take 5 mg by mouth 3 times daily Yes Historical Provider, MD   folic acid (FOLVITE) 416 MCG tablet Take 400 mcg by mouth daily Yes Historical Provider, MD   aspirin 81 MG tablet Take 81 mg by mouth daily Yes Historical Provider, MD   fluticasone (FLONASE) 50 MCG/ACT nasal spray 1 spray by Each Nostril route daily Yes Historical Provider, MD   cycloSPORINE (SANDIMMUNE) 100 MG capsule Take 100 mg by mouth 2 times daily Yes Historical Provider, MD   cycloSPORINE (SANDIMMUNE) 25 MG capsule Take 25 mg by mouth daily Yes Historical Provider, MD   valGANciclovir (VALCYTE) 450 MG tablet Take 450 mg by mouth Yes Historical Provider, MD   calcium carbonate (TUMS) 500 MG chewable tablet Take 2 tablets by mouth daily Yes Historical Provider, MD   predniSONE (DELTASONE) 5 MG tablet Take 5 mg by mouth daily Yes Historical Provider, MD   nitroGLYCERIN (NITROSTAT) 0.4 MG SL tablet Place 0.4 mg under the tongue every 5 minutes as needed for Chest pain up to max of 3 total doses. If no relief after 1 dose, call 911.  Yes Historical Provider, MD   guaiFENesin 400 MG tablet Take 400 mg by mouth 4 times daily as needed for Cough Yes Historical Provider, MD   Simethicone 40 MG STRP Take by mouth Yes Historical Provider, MD   Multiple Vitamins-Minerals (THERAPEUTIC MULTIVITAMIN-MINERALS) tablet Take 1 tablet by mouth daily Yes Historical Provider, MD   mirtazapine (REMERON SOL-TAB) 15 MG disintegrating tablet DISSOLVE ONE TABLET IN MOUTH DAILY AT BEDTIME  Historical Provider, MD         Past Medical History:   Diagnosis Date    Allergic rhinitis     CAD (coronary artery disease)     Ishcemia CABS X 2 4/20/17 [facility]    COPD (chronic obstructive pulmonary disease) ([facility] [address]     O2 3L [facility]    Emphysema (subcutaneous) (surgical) resulting from a procedure     Upper Lobes    Emphysema lung ([facility] [address]     GERD (gastroesophageal reflux disease)     Hyperlipidemia     Hypertension     IFG (impaired fasting glucose)     Obesity     Pulmonary fibrosis (HCC)     Benign Nodules - restrictive    Vitamin D insufficiency        Past Surgical History:   Procedure Laterality Date    APPENDECTOMY      BRONCHOSCOPY  05/12/2017    Bedside - 6/18/18 - [facility] - [facility] - 8/13/18    CABG WITH AORTIC VALVE REPAIR  04/20/2017    X 2 [facility]    CATARACT REMOVAL WITH IMPLANT Bilateral     OS 11/7/18, OD 11/19/18 - [place]    CHOLECYSTECTOMY      DIAPHRAGM SURGERY Left 08/11/2017    LUNG DECORTICATION      LUNG TRANSPLANT, 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    LUNG TRANSPLANT, DOUBLE      THORACOTOMY      TRACHEOSTOMY  05/01/2017    VOCAL CORD SURGERY      Nodules, Skin Lesions - [facility]         Family History   Problem Relation Age of Onset    Heart Failure Mother     Other Father         AAA    Cancer Brother         Lung    Coronary Art Dis Brother     Heart Failure Brother         Methothelioma       CareTeam (Including outside providers/suppliers regularly involved in providing care):   Patient Care Team:  Josiah Sewell DO as PCP - General (Family Medicine)  Josiah Sewell DO as PCP - [facility] Empaneled Provider  William Roberts DO    Wt Readings from Last 3 Encounters:   12/15/21 194 lb 6.4 oz (88.2 kg)   12/02/21 192 lb (87.1 kg)   10/27/21 193 lb (87.5 kg)     Vitals:    12/15/21 1014   BP: 130/70   Pulse: 97   Temp: 97.8 °F (36.6 °C)   SpO2: 94%   Weight: 194 lb 6.4 oz (88.2 kg)   Height: 5' 4\" (1.626 m)     Body mass index is 33.37 kg/m². Based upon direct observation of the patient, evaluation of cognition reveals recent and remote memory intact. Physical Exam  Vitals and nursing note reviewed. Constitutional:       General: He is not in acute distress. Appearance: Normal appearance. He is well-developed. He is obese. He is ill-appearing (chronically). HENT:      Head: Normocephalic and atraumatic. Right Ear: Hearing and external ear normal.      Left Ear: Hearing and external ear normal.      Nose:      Comments: Patient wearing mask  Eyes:      General: Lids are normal. No scleral icterus. Extraocular Movements: Extraocular movements intact. Conjunctiva/sclera: Conjunctivae normal.   Neck:      Thyroid: No thyromegaly. Cardiovascular:      Rate and Rhythm: Normal rate and regular rhythm. Heart sounds: Normal heart sounds. No murmur heard. Pulmonary:      Effort: Pulmonary effort is normal. No respiratory distress. Breath sounds: Normal breath sounds. No wheezing. Musculoskeletal:         General: No tenderness or deformity. Normal range of motion. Cervical back: Normal range of motion and neck supple. Right lower leg: No edema. Left lower leg: No edema. Lymphadenopathy:      Cervical: No cervical adenopathy. Skin:     General: Skin is warm and dry. Findings: No rash. Neurological:      General: No focal deficit present. Mental Status: He is alert and oriented to person, place, and time.       Gait: Gait normal. Psychiatric:         Mood and Affect: Mood and affect normal.         Speech: Speech normal.         Behavior: Behavior normal.         Thought Content: Thought content normal.         Patient's complete Health Risk Assessment and screening values have been reviewed and are found in Flowsheets. The following problems were reviewed today and where indicated follow up appointments were made and/or referrals ordered. Positive Risk Factor Screenings with Interventions:     Fall Risk:  2 or more falls in past year?: (!) yes  Fall with injury in past year?: (!) yes  Fall Risk Interventions:    · Previously aware of falls--now using cane/walker routinely. Feeling stronger          General Health and ACP:  General  In general, how would you say your health is?: Very Good  In the past 7 days, have you experienced any of the following? New or Increased Pain, New or Increased Fatigue, Loneliness, Social Isolation, Stress or Anger?: (!) New or Increased Pain, Stress  Do you get the social and emotional support that you need?: Yes  Do you have a Living Will?: Yes  Advance Directives     Power of  Living Will ACP-Advance Directive ACP-Power of     Not on File Not on File Not on File Not on File      General Health Risk Interventions:  · Pain issues: R knee medially- improving slowly.   Discussed Voltaren  · Stress: Multiple health issues    Health Habits/Nutrition:  Health Habits/Nutrition  Do you exercise for at least 20 minutes 2-3 times per week?: Yes  Have you lost any weight without trying in the past 3 months?: No  Do you eat only one meal per day?: No  Have you seen the dentist within the past year?: Yes  Body mass index: (!) 33.37  Health Habits/Nutrition Interventions:  · Doing much better at this time    Hearing/Vision:  No exam data present  Hearing/Vision  Do you or your family notice any trouble with your hearing that hasn't been managed with hearing aids?: (!) Yes  Do you have difficulty driving, watching TV, or doing any of your daily activities because of your eyesight?: No  Have you had an eye exam within the past year?: Yes  Hearing/Vision Interventions:  · Hearing concerns:  patient declines any further evaluation/treatment for hearing issues     ADL:  ADLs  In the past 7 days, did you need help from others to perform any of the following everyday activities? Eating, dressing, grooming, bathing, toileting, or walking/balance?: (!) Walking/Balance  In the past 7 days, did you need help from others to take care of any of the following?  Laundry, housekeeping, banking/finances, shopping, telephone use, food preparation, transportation, or taking medications?: (!) Laundry, Housekeeping, [employer], Transportation  ADL Interventions:  · Patient declines any further evaluation/treatment for this issue        Personalized Preventive Plan   Current Health Maintenance Status  Immunization History   Administered Date(s) Administered    COVID-19, Felipe Wolfe, Primary or Immunocompromised, PF, 100mcg/0.5mL 02/24/2021, 03/24/2021, 11/09/2021    Hepatitis B vaccine 12/19/2019, 02/11/2020, 03/10/2020, 06/09/2020, 08/11/2020, 09/15/2020, 10/13/2020, 01/12/2021    Influenza Virus Vaccine 11/05/2010, 10/18/2011, 09/18/2012, 09/17/2013, 09/24/2014, 10/27/2015, 09/19/2016    Influenza, High Dose (Fluzone 65 yrs and older) 10/13/2015, 10/08/2017, 10/10/2018, 10/22/2019, 10/14/2020    Influenza, High-dose, Prasanna Regan, 65 yrs +, IM (Fluzone) 10/06/2021    Influenza, Triv, inactivated, subunit, adjuvanted, IM (Fluad 65 yrs and older) 10/11/2018    Pneumococcal Conjugate 13-valent ([id number]) 11/15/2016    Pneumococcal Polysaccharide ([id number]) 10/16/2014, 10/12/2016    Tdap (Boostrix, Adacel) 06/26/2019, 09/21/2021        Health Maintenance   Topic Date Due    Lipid screen  09/14/2017    Annual Wellness Visit (AWV)  11/17/2021    Shingles Vaccine (1 of 2) 12/02/2022 (Originally 3/8/1999)    COVID-19 Vaccine (4 - Booster for Moderna series) 05/09/2022    Colon cancer screen colonoscopy  11/03/2024    DTaP/Tdap/Td vaccine (3 - Td or Tdap) 09/21/2031    Flu vaccine  Completed    Pneumococcal 65+ yrs at Risk Vaccine  Completed    Hepatitis A vaccine  Aged Out    Hib vaccine  Aged Out    Meningococcal (ACWY) vaccine  Aged Out    Hepatitis B vaccine  Discontinued    AAA screen  Discontinued    Hepatitis C screen  Discontinued     Recommendations for Preventive Services Due: see orders and patient instructions/AVS.    Recommended screening schedule for the next 5-10 years is provided to the patient in written form: see Patient Instructions/AVS.      Assessment and Plan  Demetris Ramos was seen today for medicare awv. Diagnoses and all orders for this visit:    Routine general medical examination at a health care facility  HRA reviewed and addressed. UTD on HM. Follows with Cardio for lipids, nephro/dialysis for all other labs. Just had done on Monday. Return in about 3 months (around 3/15/2022), or if symptoms worsen or fail to improve, for Chronic medical conditions.       Seen By:  Ruslan Rogel, DO

## 2021-12-29 ENCOUNTER — OFFICE VISIT (OUTPATIENT)
Dept: FAMILY MEDICINE CLINIC | Age: 71
End: 2021-12-29
Payer: MEDICARE

## 2021-12-29 ENCOUNTER — TELEPHONE (OUTPATIENT)
Dept: FAMILY MEDICINE CLINIC | Age: 71
End: 2021-12-29

## 2021-12-29 VITALS
RESPIRATION RATE: 22 BRPM | DIASTOLIC BLOOD PRESSURE: 90 MMHG | SYSTOLIC BLOOD PRESSURE: 158 MMHG | BODY MASS INDEX: 32.44 KG/M2 | TEMPERATURE: 97.8 F | OXYGEN SATURATION: 93 % | HEART RATE: 101 BPM | WEIGHT: 190 LBS | HEIGHT: 64 IN

## 2021-12-29 DIAGNOSIS — R05.9 COUGH: Primary | ICD-10-CM

## 2021-12-29 DIAGNOSIS — Z94.2 S/P LUNG TRANSPLANT (HCC): ICD-10-CM

## 2021-12-29 DIAGNOSIS — R05.9 COUGH: ICD-10-CM

## 2021-12-29 LAB
Lab: NORMAL
PERFORMING INSTRUMENT: NORMAL
QC PASS/FAIL: NORMAL
SARS-COV-2, POC: NORMAL

## 2021-12-29 PROCEDURE — G8427 DOCREV CUR MEDS BY ELIG CLIN: HCPCS | Performed by: PHYSICIAN ASSISTANT

## 2021-12-29 PROCEDURE — 1036F TOBACCO NON-USER: CPT | Performed by: PHYSICIAN ASSISTANT

## 2021-12-29 PROCEDURE — 3017F COLORECTAL CA SCREEN DOC REV: CPT | Performed by: PHYSICIAN ASSISTANT

## 2021-12-29 PROCEDURE — 99204 OFFICE O/P NEW MOD 45 MIN: CPT | Performed by: PHYSICIAN ASSISTANT

## 2021-12-29 PROCEDURE — 87426 SARSCOV CORONAVIRUS AG IA: CPT | Performed by: PHYSICIAN ASSISTANT

## 2021-12-29 PROCEDURE — 1123F ACP DISCUSS/DSCN MKR DOCD: CPT | Performed by: PHYSICIAN ASSISTANT

## 2021-12-29 PROCEDURE — G8417 CALC BMI ABV UP PARAM F/U: HCPCS | Performed by: PHYSICIAN ASSISTANT

## 2021-12-29 PROCEDURE — G8484 FLU IMMUNIZE NO ADMIN: HCPCS | Performed by: PHYSICIAN ASSISTANT

## 2021-12-29 PROCEDURE — 4040F PNEUMOC VAC/ADMIN/RCVD: CPT | Performed by: PHYSICIAN ASSISTANT

## 2021-12-29 RX ORDER — DOXYCYCLINE HYCLATE 100 MG
100 TABLET ORAL 2 TIMES DAILY
Qty: 20 TABLET | Refills: 0 | Status: SHIPPED | OUTPATIENT
Start: 2021-12-29 | End: 2022-01-08

## 2021-12-29 RX ORDER — LIDOCAINE AND PRILOCAINE 25; 25 MG/G; MG/G
CREAM TOPICAL
COMMUNITY
Start: 2021-12-28

## 2021-12-29 RX ORDER — PREDNISONE 20 MG/1
40 TABLET ORAL DAILY
Qty: 10 TABLET | Refills: 0 | Status: SHIPPED | OUTPATIENT
Start: 2021-12-29 | End: 2022-01-03

## 2021-12-29 ASSESSMENT — ENCOUNTER SYMPTOMS
SHORTNESS OF BREATH: 0
PHOTOPHOBIA: 0
DIARRHEA: 0
VOMITING: 0
BACK PAIN: 0
ABDOMINAL PAIN: 0
SORE THROAT: 0
NAUSEA: 0
COUGH: 1

## 2021-12-29 NOTE — TELEPHONE ENCOUNTER
Please contact the patient and let him know that they do see worsening infiltrative changes in the left lower lobe that has worsened from previous compared to 8/24/2021. This may represent pneumonia. Please begin the doxycycline. I would advise that he let his physicians at the [facility] [employer] [facility] clinic know of the pneumonia. I recommend that he follow-up closely with his family physician in [facility] for close follow-up. If any signs or symptoms worsen or he develops fever, shortness of breath or chest pain go directly to the emergency department.

## 2021-12-29 NOTE — PROGRESS NOTES
amb  21  Ana Rodgers : 1950 Sex: male  Age 66 y.o. Subjective:  Chief Complaint   Patient presents with    Cough    Congestion         53-year-old male with a history of CHF, CAD, pulmonary hypertension, hypertension, kidney disease and status post lung transplant presents to the walk-in clinic for evaluation of chest congestion and sinus congestion that started a couple days ago. He states that he does have a productive cough with green to yellow sputum production. He is allowed to take over-the-counter Delsym and has been doing so. He does get dialysis 4 days a week. He also does duo nebs at home. Patient states overall his cough has improved. He is 5 years this coming April post double lung transplant. He is vaccinated for COVID-19 plus has had his booster. He denies any known exposures. No fever or chills. No shortness of breath or chest pain. No hemoptysis. No nausea or vomiting. He is here with his wife. Review of Systems   Constitutional: Negative for chills and fever. HENT: Positive for congestion. Negative for ear pain and sore throat. Eyes: Negative for photophobia and visual disturbance. Respiratory: Positive for cough. Negative for shortness of breath. Cardiovascular: Negative for chest pain. Gastrointestinal: Negative for abdominal pain, diarrhea, nausea and vomiting. Genitourinary: Negative for difficulty urinating, dysuria, frequency and urgency. Musculoskeletal: Negative for back pain, neck pain and neck stiffness. Skin: Negative for rash. Neurological: Negative for dizziness, syncope, weakness, light-headedness and headaches. Hematological: Negative for adenopathy. Does not bruise/bleed easily. Psychiatric/Behavioral: Negative for agitation and confusion. All other systems reviewed and are negative.         PMH:     Past Medical History:   Diagnosis Date    Allergic rhinitis     CAD (coronary artery disease)     Ishcemia CABS X 2 4/20/17 [facility]    COPD (chronic obstructive pulmonary disease) (HCC)     O2 3L PNC    Emphysema (subcutaneous) (surgical) resulting from a procedure     Upper Lobes    Emphysema lung ([address]     GERD (gastroesophageal reflux disease)     Hyperlipidemia     Hypertension     IFG (impaired fasting glucose)     Obesity     Pulmonary fibrosis (HCC)     Benign Nodules - restrictive    Vitamin D insufficiency        Past Surgical History:   Procedure Laterality Date    APPENDECTOMY      BRONCHOSCOPY  05/12/2017    Bedside - 6/18/18 - [place] - [facility] - 8/13/18    CABG WITH AORTIC VALVE REPAIR  04/20/2017    X 2 [facility]    CATARACT REMOVAL WITH IMPLANT Bilateral     OS 11/7/18, OD 11/19/18 - [facility]    CHOLECYSTECTOMY      DIAPHRAGM SURGERY Left 08/11/2017    LUNG DECORTICATION      LUNG TRANSPLANT, 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  COVER WITH OCCLUSIVE DRESSING (SARAN WRAP), Disp: , Rfl:     doxycycline hyclate (VIBRA-TABS) 100 MG tablet, Take 1 tablet by mouth 2 times daily for 10 days, Disp: 20 tablet, Rfl: 0    predniSONE (DELTASONE) 20 MG tablet, Take 2 tablets by mouth daily for 5 days, Disp: 10 tablet, Rfl: 0    pantoprazole (PROTONIX) 40 MG tablet, TAKE ONE TABLET BY MOUTH TWO TIMES A DAY BEFORE MEALS (6AM AND 4PM), Disp: , Rfl:     mirtazapine (REMERON SOL-TAB) 15 MG disintegrating tablet, DISSOLVE ONE TABLET IN MOUTH DAILY AT BEDTIME, Disp: , Rfl:     rosuvastatin (CRESTOR) 5 MG tablet, TAKE fluticasone (FLONASE) 50 MCG/ACT nasal spray, 1 spray by Each Nostril route daily, Disp: , Rfl:     cycloSPORINE (SANDIMMUNE) 100 MG capsule, Take 100 mg by mouth 2 times daily, Disp: , Rfl:     cycloSPORINE (SANDIMMUNE) 25 MG capsule, Take 25 mg by mouth daily, Disp: , Rfl:     valGANciclovir (VALCYTE) 450 MG tablet, Take 450 mg by mouth, Disp: , Rfl:     calcium carbonate (TUMS) 500 MG chewable tablet, Take 2 tablets by mouth daily, Disp: , Rfl:     predniSONE (DELTASONE) 5 MG tablet, Take 5 mg by mouth daily, Disp: , Rfl:     nitroGLYCERIN (NITROSTAT) 0.4 MG SL tablet, Place 0.4 mg under the tongue every 5 minutes as needed for Chest pain up to max of 3 total doses. If no relief after 1 dose, call 911., Disp: , Rfl:     guaiFENesin 400 MG tablet, Take 400 mg by mouth 4 times daily as needed for Cough, Disp: , Rfl:     Simethicone 40 MG STRP, Take by mouth, Disp: , Rfl:     Multiple Vitamins-Minerals (THERAPEUTIC MULTIVITAMIN-MINERALS) tablet, Take 1 tablet by mouth daily, Disp: , Rfl:     Allergies: Allergies   Allergen Reactions    Ceftriaxone Itching     Itching at IV site & red streak along the arm within 5 minutes of infusion      Heparin Other (See Comments)     MEÑO      Lorazepam Other (See Comments)    Tacrolimus Other (See Comments)     Agitation         Social History:     Social History     Tobacco Use    Smoking status: Former Smoker     Packs/day: 2.00     Years: 35.00     Pack years: 70.00     Quit date: 1997     Years since quittin.3    Smokeless tobacco: Never Used    Tobacco comment: Quit 18 years ago   Substance Use Topics    Alcohol use: Never    Drug use: Never       Patient lives at home.     Physical Exam:     Vitals:    21 1054 21 1056   BP: (!) 158/90 (!) 158/90   Pulse: 101    Resp: 22    Temp: 97.8 °F (36.6 °C)    TempSrc: Temporal    SpO2: 93%    Weight: 190 lb (86.2 kg)    Height: 5' 4\" (1.626 m)        Exam:  Physical Exam  Vitals and nursing note reviewed. Constitutional:       General: He is not in acute distress. Appearance: He is well-developed. HENT:      Head: Normocephalic and atraumatic. Right Ear: Tympanic membrane normal.      Left Ear: Tympanic membrane normal.      Nose: Nose normal.      Mouth/Throat:      Mouth: Mucous membranes are moist.      Pharynx: Oropharynx is clear. Eyes:      Conjunctiva/sclera: Conjunctivae normal.      Pupils: Pupils are equal, round, and reactive to light. Cardiovascular:      Rate and Rhythm: Normal rate and regular rhythm. Pulmonary:      Effort: Pulmonary effort is normal. No respiratory distress. Breath sounds: Normal breath sounds. Abdominal:      General: Bowel sounds are normal.      Palpations: Abdomen is soft. Tenderness: There is no abdominal tenderness. Musculoskeletal:         General: Normal range of motion. Cervical back: Normal range of motion. No rigidity. Lymphadenopathy:      Cervical: No cervical adenopathy. Skin:     General: Skin is warm and dry. Neurological:      General: No focal deficit present. Mental Status: He is alert and oriented to person, place, and time. Psychiatric:         Mood and Affect: Mood normal.         Behavior: Behavior normal.         Thought Content: Thought content normal.         Judgment: Judgment normal.           Testing:     Results for orders placed or performed in visit on 12/29/21   POCT COVID-19, Antigen   Result Value Ref Range    SARS-COV-2, POC Not-Detected Not Detected    Lot Number [id number]     QC Pass/Fail pass     Performing Instrument Reachoo            Medical Decision Making:     Patient upon arrival did not appear toxic or lethargic. Vital signs were reviewed. Past medical history reviewed. Allergies reviewed. Medications reviewed. Patient is presenting with the above complaint of cough and congestion. Rapid antigen is negative for COVID-19. COVID-19 PCR is pending. Differential diagnosis was discussed. He will be sent for chest x-ray at [facility] [place]. At this point he will quarantine until we have the results of his Covid test back. The patient is to treat his symptoms with over-the-counter analgesics and decongestants. He is to maintain good oral intake. If positive for COVID-19 he is to follow the current [facility] guidelines for isolation and quarantine. He will continue his nebulizer treatments at home. His wife would like a prescription for an antibiotic. He has taken doxycycline and prednisone in the past without any difficulty. He will be given these prescriptions today. He also has a prescription for Meredith Amend at home. Patient understands plan is agreeable. We discussed signs and symptoms that would warrant emergent evaluation the emergency department. Patient will follow up as needed with his PCP. X-ray does reveal worsening infiltrative changes in the left lower lobe from 8/24/2021. Correlate for pneumonia. Patient was contacted with these results. He was started on doxycycline and prednisone. He is to let his surgeons at the [facility] know of these findings. He is to follow-up closely with his family physician in the next few days for close monitoring evaluation. He was educated again on signs and symptoms that would warrant emergent evaluation emergency department. Clinical Impression:   Suzi Goel was seen today for cough and congestion. Diagnoses and all orders for this visit:    Cough  -     POCT COVID-19, Antigen  -     XR CHEST (2 VW); Future  -     COVID-19 Ambulatory; Future    S/P lung transplant ([facility] [address]    Other orders  -     doxycycline hyclate (VIBRA-TABS) 100 MG tablet; Take 1 tablet by mouth 2 times daily for 10 days  -     predniSONE (DELTASONE) 20 MG tablet; Take 2 tablets by mouth daily for 5 days        The patient is to call for any concerns or return if any of the signs or symptoms worsen.  The patient is to follow-up with PCP in the next 2-3 days for repeat evaluation repeat assessment or go directly to the emergency department. SIGNATURE: Ally Garcia PA-C

## 2021-12-31 LAB
SARS-COV-2: NOT DETECTED
SOURCE: NORMAL

## 2022-01-04 ENCOUNTER — TELEPHONE (OUTPATIENT)
Dept: FAMILY MEDICINE CLINIC | Age: 72
End: 2022-01-04

## 2022-01-06 ENCOUNTER — OFFICE VISIT (OUTPATIENT)
Dept: PRIMARY CARE CLINIC | Age: 72
End: 2022-01-06
Payer: MEDICARE

## 2022-01-06 VITALS
HEART RATE: 93 BPM | TEMPERATURE: 97 F | WEIGHT: 192 LBS | OXYGEN SATURATION: 99 % | DIASTOLIC BLOOD PRESSURE: 72 MMHG | BODY MASS INDEX: 32.78 KG/M2 | HEIGHT: 64 IN | SYSTOLIC BLOOD PRESSURE: 148 MMHG

## 2022-01-06 DIAGNOSIS — E78.2 MIXED HYPERLIPIDEMIA: ICD-10-CM

## 2022-01-06 DIAGNOSIS — Z99.2 DEPENDENCE ON RENAL DIALYSIS (HCC): ICD-10-CM

## 2022-01-06 DIAGNOSIS — R06.02 SHORTNESS OF BREATH: Primary | ICD-10-CM

## 2022-01-06 DIAGNOSIS — J18.9 PNEUMONIA OF LEFT LOWER LOBE DUE TO INFECTIOUS ORGANISM: ICD-10-CM

## 2022-01-06 DIAGNOSIS — I50.32 CHRONIC DIASTOLIC CHF (CONGESTIVE HEART FAILURE) (HCC): ICD-10-CM

## 2022-01-06 DIAGNOSIS — Z94.2 S/P LUNG TRANSPLANT (HCC): ICD-10-CM

## 2022-01-06 DIAGNOSIS — I10 PRIMARY HYPERTENSION: ICD-10-CM

## 2022-01-06 DIAGNOSIS — R06.02 SHORTNESS OF BREATH: ICD-10-CM

## 2022-01-06 DIAGNOSIS — I25.118 CORONARY ARTERY DISEASE OF NATIVE HEART WITH STABLE ANGINA PECTORIS, UNSPECIFIED VESSEL OR LESION TYPE (HCC): ICD-10-CM

## 2022-01-06 LAB
BASOPHILS ABSOLUTE: 0.02 E9/L (ref 0–0.2)
BASOPHILS RELATIVE PERCENT: 0.3 % (ref 0–2)
EOSINOPHILS ABSOLUTE: 0.05 E9/L (ref 0.05–0.5)
EOSINOPHILS RELATIVE PERCENT: 0.8 % (ref 0–6)
HCT VFR BLD CALC: 31.9 % (ref 37–54)
HEMOGLOBIN: 10.1 G/DL (ref 12.5–16.5)
IMMATURE GRANULOCYTES #: 0.11 E9/L
IMMATURE GRANULOCYTES %: 1.7 % (ref 0–5)
LYMPHOCYTES ABSOLUTE: 1.12 E9/L (ref 1.5–4)
LYMPHOCYTES RELATIVE PERCENT: 17.2 % (ref 20–42)
MCH RBC QN AUTO: 34.7 PG (ref 26–35)
MCHC RBC AUTO-ENTMCNC: 31.7 % (ref 32–34.5)
MCV RBC AUTO: 109.6 FL (ref 80–99.9)
MONOCYTES ABSOLUTE: 0.39 E9/L (ref 0.1–0.95)
MONOCYTES RELATIVE PERCENT: 6 % (ref 2–12)
NEUTROPHILS ABSOLUTE: 4.81 E9/L (ref 1.8–7.3)
NEUTROPHILS RELATIVE PERCENT: 74 % (ref 43–80)
PDW BLD-RTO: 19.6 FL (ref 11.5–15)
PLATELET # BLD: 133 E9/L (ref 130–450)
PMV BLD AUTO: 10 FL (ref 7–12)
RBC # BLD: 2.91 E12/L (ref 3.8–5.8)
WBC # BLD: 6.5 E9/L (ref 4.5–11.5)

## 2022-01-06 PROCEDURE — 1036F TOBACCO NON-USER: CPT | Performed by: FAMILY MEDICINE

## 2022-01-06 PROCEDURE — G8417 CALC BMI ABV UP PARAM F/U: HCPCS | Performed by: FAMILY MEDICINE

## 2022-01-06 PROCEDURE — G8484 FLU IMMUNIZE NO ADMIN: HCPCS | Performed by: FAMILY MEDICINE

## 2022-01-06 PROCEDURE — 99214 OFFICE O/P EST MOD 30 MIN: CPT | Performed by: FAMILY MEDICINE

## 2022-01-06 PROCEDURE — G8427 DOCREV CUR MEDS BY ELIG CLIN: HCPCS | Performed by: FAMILY MEDICINE

## 2022-01-06 PROCEDURE — 4040F PNEUMOC VAC/ADMIN/RCVD: CPT | Performed by: FAMILY MEDICINE

## 2022-01-06 PROCEDURE — 3017F COLORECTAL CA SCREEN DOC REV: CPT | Performed by: FAMILY MEDICINE

## 2022-01-06 PROCEDURE — 1123F ACP DISCUSS/DSCN MKR DOCD: CPT | Performed by: FAMILY MEDICINE

## 2022-01-06 RX ORDER — METOPROLOL SUCCINATE 25 MG/1
12.5 TABLET, EXTENDED RELEASE ORAL DAILY
Qty: 45 TABLET | Refills: 1 | Status: SHIPPED
Start: 2022-01-06 | End: 2022-01-18 | Stop reason: ALTCHOICE

## 2022-01-06 NOTE — PROGRESS NOTES
22  Martha German : 1950 Sex: male  Age: 66 y.o. Chief Complaint   Patient presents with    Pneumonia     seen in [place] - went to the ED and left on tuesday ED was packed. does get SOB on exertion      HPI:  66 y.o. male presents today for follow up of pneumonia diagnosis. Patient's chart, medical, surgical and medication history all reviewed. [facility] due to cough and SOB. He was diagnosed with LLL PNA and treated with Doxycycline and Prednisone. He and wife noted improvement in symptoms, but then in the last week, they have worsened again. Mostly having issues with SHEIKH. Cough resolved. Symptoms mostly stable when he uses mucinex and nebulizer as prescribed. ROS:  Review of Systems   Constitutional: Negative for chills, fatigue and fever. Respiratory: Positive for shortness of breath (SHEIKH). Negative for cough and wheezing. Cardiovascular: Positive for leg swelling. Negative for chest pain and palpitations. Gastrointestinal: Negative for abdominal pain, constipation, diarrhea, nausea and vomiting. Musculoskeletal: Positive for gait problem. Negative for arthralgias and back pain. Skin: Negative for rash. Neurological: Positive for weakness. Negative for dizziness and headaches. Hematological: Bruises/bleeds easily. Psychiatric/Behavioral: Positive for sleep disturbance. Negative for dysphoric mood. The patient is nervous/anxious. All other systems reviewed and are negative. Current Outpatient Medications on File Prior to Visit   Medication Sig Dispense Refill    lidocaine-prilocaine (EMLA) 2.5-2.5 % cream APPLY SMALL AMOUNT TO ACCESS SITE (AVF) 1 HOUR BEFORE DIALYSIS.  COVER WITH OCCLUSIVE DRESSING (SARAN WRAP)      pantoprazole (PROTONIX) 40 MG tablet TAKE ONE TABLET BY MOUTH TWO TIMES A DAY BEFORE MEALS (6AM AND 4PM)      mirtazapine (REMERON SOL-TAB) 15 MG disintegrating tablet DISSOLVE ONE TABLET IN MOUTH DAILY AT 25 mg by mouth daily      valGANciclovir (VALCYTE) 450 MG tablet Take 450 mg by mouth      calcium carbonate (TUMS) 500 MG chewable tablet Take 2 tablets by mouth daily      predniSONE (DELTASONE) 5 MG tablet Take 5 mg by mouth daily      nitroGLYCERIN (NITROSTAT) 0.4 MG SL tablet Place 0.4 mg under the tongue every 5 minutes as needed for Chest pain up to max of 3 total doses. If no relief after 1 dose, call 911.  guaiFENesin 400 MG tablet Take 400 mg by mouth 4 times daily as needed for Cough      Simethicone 40 MG STRP Take by mouth      Multiple Vitamins-Minerals (THERAPEUTIC MULTIVITAMIN-MINERALS) tablet Take 1 tablet by mouth daily       No current facility-administered medications on file prior to visit.        Allergies   Allergen Reactions    Ceftriaxone Itching     Itching at IV site & red streak along the arm within 5 minutes of infusion      Heparin Other (See Comments)     MEÑO      Lorazepam Other (See Comments)    Tacrolimus Other (See Comments)     Agitation         Past Medical History:   Diagnosis Date    Allergic rhinitis     CAD (coronary artery disease)     Ishcemia CABS X 2 4/20/17 [facility]    COPD (chronic obstructive pulmonary disease) (HCC)     O2 3L [facility]    Emphysema (subcutaneous) (surgical) resulting from a procedure     Upper Lobes    Emphysema lung ([address]     GERD (gastroesophageal reflux disease)     Hyperlipidemia     Hypertension     IFG (impaired fasting glucose)     Obesity     Pulmonary fibrosis (HCC)     Benign Nodules - restrictive    Vitamin D insufficiency      Past Surgical History:   Procedure Laterality Date    APPENDECTOMY      BRONCHOSCOPY  05/12/2017    Bedside - 6/18/18 - [facility] - [facility] - 8/13/18    CABG WITH AORTIC VALVE REPAIR  04/20/2017    X 2 [facility]    CATARACT REMOVAL WITH IMPLANT Bilateral     OS 11/7/18, OD 11/19/18 - [facility]    CHOLECYSTECTOMY      DIAPHRAGM SURGERY Left 08/11/2017    LUNG DECORTICATION      LUNG TRANSPLANT, DOUBLE Intimate Partner Violence:     Fear of Current or Ex-Partner: Not on file    Emotionally Abused: Not on file    Physically Abused: Not on file    Sexually Abused: Not on file   Housing Stability:     Unable to Pay for Housing in the Last Year: Not on file    Number of [place] in the Last Year: Not on file    Unstable Housing in the Last Year: Not on file       Vitals:    01/06/22 1501   BP: (!) 148/72   Pulse: 93   Temp: 97 °F (36.1 °C)   SpO2: 99%   Weight: 192 lb (87.1 kg)   Height: 5' 4\" (1.626 m)       Physical Exam:  Physical Exam  Vitals and nursing note reviewed. Constitutional:       General: He is not in acute distress. Appearance: Normal appearance. He is well-developed. He is obese. He is ill-appearing (chronically). HENT:      Head: Normocephalic and atraumatic. Right Ear: Hearing and external ear normal.      Left Ear: Hearing and external ear normal.      Nose:      Comments: Patient wearing mask  Eyes:      General: Lids are normal. No scleral icterus. Extraocular Movements: Extraocular movements intact. Conjunctiva/sclera: Conjunctivae normal.   Neck:      Thyroid: No thyromegaly. Cardiovascular:      Rate and Rhythm: Normal rate and regular rhythm. Heart sounds: Normal heart sounds. No murmur heard. Pulmonary:      Effort: Pulmonary effort is normal. No respiratory distress. Breath sounds: Normal breath sounds. No wheezing. Musculoskeletal:         General: No tenderness or deformity. Normal range of motion. Cervical back: Normal range of motion and neck supple. Right lower leg: Edema (1+ pitting to mid shin) present. Left lower leg: Edema (1+ pitting to mid shin) present. Lymphadenopathy:      Cervical: No cervical adenopathy. Skin:     General: Skin is warm and dry. Findings: No rash. Neurological:      General: No focal deficit present. Mental Status: He is alert and oriented to person, place, and time. Gait: Gait normal.   Psychiatric:         Mood and Affect: Mood and affect normal.         Speech: Speech normal.         Behavior: Behavior normal.         Thought Content: Thought content normal.          Assessment and Plan:  Umair Kearns was seen today for pneumonia. Diagnoses and all orders for this visit:    Shortness of breath  -     BRAIN NATRIURETIC PEPTIDE; Future  -     XR CHEST (2 VW); Future  Suspect SHEIKH is more related to heart pathology and CHF than pneumonia. Will check labs and repeat CXR. May need input from Nephro and Cardio on treatment steps due to dialysis. Pneumonia of left lower lobe due to infectious organism  Will recheck CXR, but has had chronic LLL infiltrate/fluid on previous xrays. Chronic diastolic CHF (congestive heart failure) (HCC)  -     BRAIN NATRIURETIC PEPTIDE; Future  -     XR CHEST (2 VW); Future    S/P lung transplant ([facility] [address]    Primary hypertension  -     Basic Metabolic Panel; Future  -     CBC Auto Differential; Future  -     BRAIN NATRIURETIC PEPTIDE; Future  -     Lipid Panel; Future  -     metoprolol succinate (TOPROL XL) 25 MG extended release tablet; Take 0.5 tablets by mouth daily  Continues to be elevated in the last few visits. Will add Metoprolol back to regimen. Mixed hyperlipidemia  -     Lipid Panel; Future    Dependence on renal dialysis [facility])    Coronary artery disease of native heart with stable angina pectoris, unspecified vessel or lesion type (HCC)  -     metoprolol succinate (TOPROL XL) 25 MG extended release tablet; Take 0.5 tablets by mouth daily        Return in about 3 months (around 4/6/2022), or if symptoms worsen or fail to improve, for Recheck.       Seen By:  Sherif Rausch DO

## 2022-01-07 DIAGNOSIS — Z99.2 STAGE 5 CHRONIC KIDNEY DISEASE ON DIALYSIS (HCC): Primary | ICD-10-CM

## 2022-01-07 DIAGNOSIS — N18.6 STAGE 5 CHRONIC KIDNEY DISEASE ON DIALYSIS (HCC): Primary | ICD-10-CM

## 2022-01-07 DIAGNOSIS — Z99.2 DEPENDENCE ON RENAL DIALYSIS (HCC): ICD-10-CM

## 2022-01-07 LAB
ANION GAP SERPL CALCULATED.3IONS-SCNC: 17 MMOL/L (ref 7–16)
BUN BLDV-MCNC: 38 MG/DL (ref 6–23)
CALCIUM SERPL-MCNC: 8.9 MG/DL (ref 8.6–10.2)
CHLORIDE BLD-SCNC: 97 MMOL/L (ref 98–107)
CHOLESTEROL, TOTAL: 157 MG/DL (ref 0–199)
CO2: 30 MMOL/L (ref 22–29)
CREAT SERPL-MCNC: 5 MG/DL (ref 0.7–1.2)
GFR AFRICAN AMERICAN: 14
GFR NON-AFRICAN AMERICAN: 11 ML/MIN/1.73
GLUCOSE BLD-MCNC: 117 MG/DL (ref 74–99)
HDLC SERPL-MCNC: 50 MG/DL
LDL CHOLESTEROL CALCULATED: 61 MG/DL (ref 0–99)
POTASSIUM SERPL-SCNC: 4.4 MMOL/L (ref 3.5–5)
PRO-BNP: ABNORMAL PG/ML (ref 0–125)
SODIUM BLD-SCNC: 144 MMOL/L (ref 132–146)
TRIGL SERPL-MCNC: 231 MG/DL (ref 0–149)
VITAMIN D 25-HYDROXY: 41 NG/ML (ref 30–100)
VLDLC SERPL CALC-MCNC: 46 MG/DL

## 2022-01-09 ASSESSMENT — ENCOUNTER SYMPTOMS
CONSTIPATION: 0
COUGH: 0
WHEEZING: 0
NAUSEA: 0
DIARRHEA: 0
ABDOMINAL PAIN: 0
BACK PAIN: 0
SHORTNESS OF BREATH: 1
VOMITING: 0

## 2022-01-11 ENCOUNTER — TELEPHONE (OUTPATIENT)
Dept: FAMILY MEDICINE CLINIC | Age: 72
End: 2022-01-11

## 2022-01-11 DIAGNOSIS — N18.6 STAGE 5 CHRONIC KIDNEY DISEASE ON DIALYSIS (HCC): Primary | ICD-10-CM

## 2022-01-11 DIAGNOSIS — I50.32 CHRONIC DIASTOLIC CHF (CONGESTIVE HEART FAILURE) (HCC): ICD-10-CM

## 2022-01-11 DIAGNOSIS — Z99.2 STAGE 5 CHRONIC KIDNEY DISEASE ON DIALYSIS (HCC): Primary | ICD-10-CM

## 2022-01-11 LAB — VITAMIN D 1,25-DIHYDROXY: 47 PG/ML (ref 19.9–79.3)

## 2022-01-18 ENCOUNTER — OFFICE VISIT (OUTPATIENT)
Dept: FAMILY MEDICINE CLINIC | Age: 72
End: 2022-01-18
Payer: MEDICARE

## 2022-01-18 VITALS
HEIGHT: 70 IN | WEIGHT: 197 LBS | BODY MASS INDEX: 28.2 KG/M2 | SYSTOLIC BLOOD PRESSURE: 104 MMHG | TEMPERATURE: 97.1 F | HEART RATE: 79 BPM | DIASTOLIC BLOOD PRESSURE: 50 MMHG | OXYGEN SATURATION: 98 %

## 2022-01-18 DIAGNOSIS — Z99.2 STAGE 5 CHRONIC KIDNEY DISEASE ON DIALYSIS (HCC): ICD-10-CM

## 2022-01-18 DIAGNOSIS — N18.6 STAGE 5 CHRONIC KIDNEY DISEASE ON DIALYSIS (HCC): ICD-10-CM

## 2022-01-18 DIAGNOSIS — Z99.2 DEPENDENCE ON RENAL DIALYSIS (HCC): ICD-10-CM

## 2022-01-18 DIAGNOSIS — I50.32 CHRONIC DIASTOLIC CHF (CONGESTIVE HEART FAILURE) (HCC): ICD-10-CM

## 2022-01-18 DIAGNOSIS — R09.89 CHEST CONGESTION: Primary | ICD-10-CM

## 2022-01-18 LAB
ALBUMIN SERPL-MCNC: 3.3 G/DL (ref 3.5–5.2)
ALP BLD-CCNC: 126 U/L (ref 40–129)
ALT SERPL-CCNC: 14 U/L (ref 0–40)
ANION GAP SERPL CALCULATED.3IONS-SCNC: 12 MMOL/L (ref 7–16)
ANISOCYTOSIS: ABNORMAL
AST SERPL-CCNC: 23 U/L (ref 0–39)
BASOPHILS ABSOLUTE: 0 E9/L (ref 0–0.2)
BASOPHILS RELATIVE PERCENT: 0.8 % (ref 0–2)
BILIRUB SERPL-MCNC: 0.5 MG/DL (ref 0–1.2)
BILIRUBIN DIRECT: <0.2 MG/DL (ref 0–0.3)
BILIRUBIN, INDIRECT: ABNORMAL MG/DL (ref 0–1)
BUN BLDV-MCNC: 23 MG/DL (ref 6–23)
CALCIUM SERPL-MCNC: 8.6 MG/DL (ref 8.6–10.2)
CHLORIDE BLD-SCNC: 98 MMOL/L (ref 98–107)
CO2: 29 MMOL/L (ref 22–29)
CREAT SERPL-MCNC: 3.5 MG/DL (ref 0.7–1.2)
EOSINOPHILS ABSOLUTE: 0 E9/L (ref 0.05–0.5)
EOSINOPHILS RELATIVE PERCENT: 0.8 % (ref 0–6)
GFR AFRICAN AMERICAN: 21
GFR NON-AFRICAN AMERICAN: 17 ML/MIN/1.73
GLUCOSE BLD-MCNC: 134 MG/DL (ref 74–99)
HCT VFR BLD CALC: 33.8 % (ref 37–54)
HEMOGLOBIN: 10.6 G/DL (ref 12.5–16.5)
HYPOCHROMIA: ABNORMAL
INFLUENZA A ANTIBODY: NORMAL
INFLUENZA B ANTIBODY: NORMAL
LYMPHOCYTES ABSOLUTE: 0.9 E9/L (ref 1.5–4)
LYMPHOCYTES RELATIVE PERCENT: 13.8 % (ref 20–42)
Lab: NORMAL
MCH RBC QN AUTO: 36.2 PG (ref 26–35)
MCHC RBC AUTO-ENTMCNC: 31.4 % (ref 32–34.5)
MCV RBC AUTO: 115.4 FL (ref 80–99.9)
MONOCYTES ABSOLUTE: 0.38 E9/L (ref 0.1–0.95)
MONOCYTES RELATIVE PERCENT: 6 % (ref 2–12)
NEUTROPHILS ABSOLUTE: 5.12 E9/L (ref 1.8–7.3)
NEUTROPHILS RELATIVE PERCENT: 80.2 % (ref 43–80)
NUCLEATED RED BLOOD CELLS: 0.9 /100 WBC
PDW BLD-RTO: 18.6 FL (ref 11.5–15)
PERFORMING INSTRUMENT: NORMAL
PLATELET # BLD: 180 E9/L (ref 130–450)
PMV BLD AUTO: 9.6 FL (ref 7–12)
POIKILOCYTES: ABNORMAL
POLYCHROMASIA: ABNORMAL
POTASSIUM SERPL-SCNC: 3.9 MMOL/L (ref 3.5–5)
PRO-BNP: ABNORMAL PG/ML (ref 0–125)
QC PASS/FAIL: NORMAL
RBC # BLD: 2.93 E12/L (ref 3.8–5.8)
SARS-COV-2, POC: NORMAL
SODIUM BLD-SCNC: 139 MMOL/L (ref 132–146)
STOMATOCYTES: ABNORMAL
TOTAL PROTEIN: 6.3 G/DL (ref 6.4–8.3)
WBC # BLD: 6.4 E9/L (ref 4.5–11.5)

## 2022-01-18 PROCEDURE — 87426 SARSCOV CORONAVIRUS AG IA: CPT | Performed by: FAMILY MEDICINE

## 2022-01-18 PROCEDURE — G8417 CALC BMI ABV UP PARAM F/U: HCPCS | Performed by: FAMILY MEDICINE

## 2022-01-18 PROCEDURE — G8484 FLU IMMUNIZE NO ADMIN: HCPCS | Performed by: FAMILY MEDICINE

## 2022-01-18 PROCEDURE — 87804 INFLUENZA ASSAY W/OPTIC: CPT | Performed by: FAMILY MEDICINE

## 2022-01-18 PROCEDURE — 3017F COLORECTAL CA SCREEN DOC REV: CPT | Performed by: FAMILY MEDICINE

## 2022-01-18 PROCEDURE — G8427 DOCREV CUR MEDS BY ELIG CLIN: HCPCS | Performed by: FAMILY MEDICINE

## 2022-01-18 PROCEDURE — 1123F ACP DISCUSS/DSCN MKR DOCD: CPT | Performed by: FAMILY MEDICINE

## 2022-01-18 PROCEDURE — 1036F TOBACCO NON-USER: CPT | Performed by: FAMILY MEDICINE

## 2022-01-18 PROCEDURE — 99214 OFFICE O/P EST MOD 30 MIN: CPT | Performed by: FAMILY MEDICINE

## 2022-01-18 PROCEDURE — 4040F PNEUMOC VAC/ADMIN/RCVD: CPT | Performed by: FAMILY MEDICINE

## 2022-01-18 RX ORDER — CARVEDILOL 6.25 MG/1
6.25 TABLET ORAL DAILY
Qty: 90 TABLET | Refills: 1 | Status: SHIPPED
Start: 2022-01-18 | End: 2022-06-06 | Stop reason: SDUPTHER

## 2022-01-18 ASSESSMENT — ENCOUNTER SYMPTOMS
COUGH: 0
NAUSEA: 0
ABDOMINAL PAIN: 0
SHORTNESS OF BREATH: 0
CONSTIPATION: 0
VOMITING: 0
DIARRHEA: 0
BACK PAIN: 0
WHEEZING: 0
CHEST TIGHTNESS: 1

## 2022-01-18 NOTE — PROGRESS NOTES
BEFORE DIALYSIS.  COVER WITH OCCLUSIVE DRESSING (SARAN WRAP)      pantoprazole (PROTONIX) 40 MG tablet TAKE ONE TABLET BY MOUTH TWO TIMES A DAY BEFORE MEALS (6AM AND 4PM)      mirtazapine (REMERON SOL-TAB) 15 MG disintegrating tablet DISSOLVE ONE TABLET IN MOUTH DAILY AT BEDTIME      rosuvastatin (CRESTOR) 5 MG tablet TAKE ONE TABLET BY MOUTH EVERY DAY      cycloSPORINE modified (NEORAL) 100 MG capsule TAKE ONE CAPSULE BY MOUTH TWO TIMES A DAY      famotidine (PEPCID) 40 MG tablet Take 40 mg by mouth daily      albuterol (PROVENTIL) (2.5 MG/3ML) 0.083% nebulizer solution Take 2.5 mg by nebulization every 6 hours as needed for Wheezing      clopidogrel (PLAVIX) 75 MG tablet Take 75 mg by mouth daily      SPS 15 GM/60ML suspension TAKE 60 ML ON SUNDAY FOR 4 WEEKS      ondansetron (ZOFRAN) 4 MG tablet Take 4 mg by mouth every 8 hours as needed      midodrine (PROAMATINE) 10 MG tablet TAKE 1 TABLET BY MOUTH EVERY MONDAY WEDNESDAY  AND FRIDAY BEFORE DIALYSIS      calcitRIOL (ROCALTROL) 0.25 MCG capsule Calcitriol Calcitriol (Rocaltrol) 0.25 MCG Capsule Active 0.25 MCG PO Daily February 5th, 2021 8:49pm 02-  [facility] ([id number])      FLUoxetine (PROZAC) 40 MG capsule TAKE ONE CAPSULE BY MOUTH DAILY 90 capsule 1    acetaminophen (TYLENOL) 325 MG tablet Take 650 mg by mouth every 6 hours as needed for Pain      benzonatate (TESSALON) 100 MG capsule Take 100 mg by mouth 3 times daily as needed for Cough      calcium acetate 667 MG TABS Take 667 mg by mouth every 12 hours      vitamin D 25 MCG (1000 UT) CAPS Take 2,000 Units by mouth daily      sodium chloride, Inhalant, 7 % nebulizer solution Take 4 mLs by nebulization as needed for Other      sulfamethoxazole-trimethoprim (BACTRIM;SEPTRA) 400-80 MG per tablet Take 1 tablet by mouth      isosorbide dinitrate (ISORDIL) 10 MG tablet Take 5 mg by mouth 3 times daily      folic acid (FOLVITE) 303 MCG tablet Take 400 mcg by mouth daily  aspirin 81 MG tablet Take 81 mg by mouth daily      fluticasone (FLONASE) 50 MCG/ACT nasal spray 1 spray by Each Nostril route daily      cycloSPORINE (SANDIMMUNE) 100 MG capsule Take 100 mg by mouth 2 times daily      cycloSPORINE (SANDIMMUNE) 25 MG capsule Take 25 mg by mouth daily      valGANciclovir (VALCYTE) 450 MG tablet Take 450 mg by mouth      calcium carbonate (TUMS) 500 MG chewable tablet Take 2 tablets by mouth daily      predniSONE (DELTASONE) 5 MG tablet Take 5 mg by mouth daily      nitroGLYCERIN (NITROSTAT) 0.4 MG SL tablet Place 0.4 mg under the tongue every 5 minutes as needed for Chest pain up to max of 3 total doses. If no relief after 1 dose, call 911.  guaiFENesin 400 MG tablet Take 400 mg by mouth 4 times daily as needed for Cough      Simethicone 40 MG STRP Take by mouth      Multiple Vitamins-Minerals (THERAPEUTIC MULTIVITAMIN-MINERALS) tablet Take 1 tablet by mouth daily       No current facility-administered medications on file prior to visit.        Allergies   Allergen Reactions    Ceftriaxone Itching     Itching at IV site & red streak along the arm within 5 minutes of infusion      Heparin Other (See Comments)     MEÑO      Lorazepam Other (See Comments)    Tacrolimus Other (See Comments)     Agitation         Past Medical History:   Diagnosis Date    Allergic rhinitis     CAD (coronary artery disease)     Ishcemia CABS X 2 4/20/17 [facility]    COPD (chronic obstructive pulmonary disease) (HCC)     O2 3L [facility]    Emphysema (subcutaneous) (surgical) resulting from a procedure     Upper Lobes    Emphysema lung (HCC)     GERD (gastroesophageal reflux disease)     Hyperlipidemia     Hypertension     IFG (impaired fasting glucose)     Obesity     Pulmonary fibrosis (HCC)     Benign Nodules - restrictive    Vitamin D insufficiency      Past Surgical History:   Procedure Laterality Date    APPENDECTOMY      BRONCHOSCOPY  05/12/2017    Bedside - 6/18/18 [facility] [place] - [facility] - 18    CABG WITH AORTIC VALVE REPAIR  04/20/2017    X 2 [facility]    CATARACT REMOVAL WITH IMPLANT Bilateral     OS 18, OD 18 - [place]    CHOLECYSTECTOMY      DIAPHRAGM SURGERY Left 2017    LUNG DECORTICATION      LUNG TRANSPLANT, DOUBLE  2017    LUNG TRANSPLANT, DOUBLE      THORACOTOMY      TRACHEOSTOMY  2017    VOCAL CORD SURGERY      Nodules, Skin Lesions - [place]     Family History   Problem Relation Age of Onset    Heart Failure Mother     Other Father         AAA    Cancer Brother         Lung    Coronary Art Dis Brother     Heart Failure Brother         Methothelioma     Social History     Socioeconomic History    Marital status:      Spouse name: Not on file    Number of children: Not on file    Years of education: Not on file    Highest education level: Not on file   Occupational History    Not on file   Tobacco Use    Smoking status: Former Smoker     Packs/day: 2.00     Years: 35.00     Pack years: 70.00     Quit date: 1997     Years since quittin.3    Smokeless tobacco: Never Used    Tobacco comment: Quit 18 years ago   Substance and Sexual Activity    Alcohol use: Never    Drug use: Never    Sexual activity: Not on file   Other Topics Concern    Not on file   Social History Narrative    Not on file     Social Determinants of Health     Financial Resource Strain:     Difficulty of Paying Living Expenses: Not on file   Food Insecurity:     Worried About [address] in the Last Year: Not on file    [address] in the Last Year: Not on file   Transportation Needs:     Lack of Transportation (Medical): Not on file    Lack of Transportation (Non-Medical):  Not on file   Physical Activity:     Days of Exercise per Week: Not on file    Minutes of Exercise per Session: Not on file   Stress:     Feeling of Stress : Not on file   Social Connections:     Frequency of Communication with Friends and Family: Not on file  Frequency of Social Gatherings with Friends and Family: Not on file    Attends Hinduism Services: Not on file    Active Member of Clubs or Organizations: Not on file    Attends Club or Organization Meetings: Not on file    Marital Status: Not on file   Intimate Partner Violence:     Fear of Current or Ex-Partner: Not on file    Emotionally Abused: Not on file    Physically Abused: Not on file    Sexually Abused: Not on file   Housing Stability:     Unable to Pay for Housing in the Last Year: Not on file    Number of [place] in the Last Year: Not on file    Unstable Housing in the Last Year: Not on file       Vitals:    01/18/22 1349   BP: (!) 104/50   Pulse: 79   Temp: 97.1 °F (36.2 °C)   SpO2: 98%   Weight: 197 lb (89.4 kg)   Height: 5' 10\" (1.778 m)       Physical Exam:  Physical Exam  Vitals and nursing note reviewed. Constitutional:       General: He is not in acute distress. Appearance: Normal appearance. He is well-developed. He is obese. He is ill-appearing (chronically). HENT:      Head: Normocephalic and atraumatic. Right Ear: Hearing and external ear normal.      Left Ear: Hearing and external ear normal.      Nose:      Comments: Patient wearing mask  Eyes:      General: Lids are normal. No scleral icterus. Extraocular Movements: Extraocular movements intact. Conjunctiva/sclera: Conjunctivae normal.   Neck:      Thyroid: No thyromegaly. Cardiovascular:      Rate and Rhythm: Normal rate and regular rhythm. Heart sounds: Normal heart sounds. No murmur heard. Pulmonary:      Effort: Pulmonary effort is normal. No respiratory distress. Breath sounds: Normal breath sounds. No wheezing. Musculoskeletal:         General: No tenderness or deformity. Normal range of motion. Cervical back: Normal range of motion and neck supple. Right lower leg: Edema (trace pitting to mid shin) present.       Left lower leg: Edema (trace pitting to mid shin) present. Lymphadenopathy:      Cervical: No cervical adenopathy. Skin:     General: Skin is warm and dry. Findings: No rash. Neurological:      General: No focal deficit present. Mental Status: He is alert and oriented to person, place, and time. Gait: Gait normal.   Psychiatric:         Mood and Affect: Mood and affect normal.         Speech: Speech normal.         Behavior: Behavior normal.         Thought Content:  Thought content normal.         CBC with Differential:    Lab Results   Component Value Date    WBC 6.5 01/06/2022    RBC 2.91 01/06/2022    HGB 10.1 01/06/2022    HCT 31.9 01/06/2022     01/06/2022    .6 01/06/2022    MCH 34.7 01/06/2022    MCHC 31.7 01/06/2022    RDW 19.6 01/06/2022    SEGSPCT 64.2 08/24/2021    LYMPHOPCT 17.2 01/06/2022    MONOPCT 6.0 01/06/2022    BASOPCT 0.3 01/06/2022    MONOSABS 0.39 01/06/2022    LYMPHSABS 1.12 01/06/2022    EOSABS 0.05 01/06/2022    BASOSABS 0.02 01/06/2022     CMP:    Lab Results   Component Value Date     01/06/2022    K 4.4 01/06/2022    CL 97 01/06/2022    CO2 30 01/06/2022    BUN 38 01/06/2022    CREATININE 5.0 01/06/2022    GFRAA 14 01/06/2022    AGRATIO 1.0 08/24/2021    LABGLOM 11 01/06/2022    GLUCOSE 117 01/06/2022    PROT 7.0 08/24/2021    LABALBU 3.5 08/24/2021    CALCIUM 8.9 01/06/2022    BILITOT 1.1 08/24/2021    ALKPHOS 109 08/24/2021    AST 30 08/24/2021    ALT 26 08/24/2021     U/A:  No results found for: NITRITE, COLORU, PROTEINU, PHUR, LABCAST, WBCUA, RBCUA, MUCUS, TRICHOMONAS, YEAST, BACTERIA, CLARITYU, SPECGRAV, LEUKOCYTESUR, UROBILINOGEN, BILIRUBINUR, BLOODU, GLUCOSEU, AMORPHOUS  HgBA1c:    Lab Results   Component Value Date    LABA1C 5.1 11/16/2020     FLP:    Lab Results   Component Value Date    TRIG 231 01/06/2022    HDL 50 01/06/2022    LDLCALC 61 01/06/2022    LABVLDL 46 01/06/2022     TSH:  No results found for: TSH  PSA: No results found for: PSA    Results for orders placed or performed in visit on 01/18/22   POCT Influenza A/B   Result Value Ref Range    Influenza A Ab neg     Influenza B Ab neg    POCT COVID-19, Antigen   Result Value Ref Range    SARS-COV-2, POC Not-Detected Not Detected    Lot Number [id number]     QC Pass/Fail pass     Performing Instrument BinaxNOW           Assessment and Plan:  Caleb Snyder was seen today for chest congestion. Diagnoses and all orders for this visit:    Chest congestion  -     POCT Influenza A/B  -     POCT COVID-19, Antigen    Stage 5 chronic kidney disease on dialysis [facility])  -     Hepatic Function Panel; Future    Chronic diastolic CHF (congestive heart failure) (HCC)  -     carvedilol (COREG) 6.25 MG tablet; Take 1 tablet by mouth daily  -     Hepatic Function Panel; Future    Rapid COVID and flu negative. Patient's symptoms improve with use of mini nebs. Encouraged more routine use of neb and mucinex. Will repeat labs. Change Metoprolol to Carvedilol to try and use lower dose of medication as Metoprolol 12.5 mg causing hypotension. Return in about 6 weeks (around 3/1/2022), or if symptoms worsen or fail to improve, for Recheck.       Seen By:  Myranda Brooke, DO

## 2022-01-18 NOTE — TELEPHONE ENCOUNTER
Pt notified. She states he finished the ATB on 1/7/22 & is still coughing up yellow mucus - she is going to bring him through [facility] in [place] today to be rechecked.

## 2022-01-19 LAB — VITAMIN D 25-HYDROXY: 69 NG/ML (ref 30–100)

## 2022-01-21 LAB — VITAMIN D 1,25-DIHYDROXY: 43.6 PG/ML (ref 19.9–79.3)

## 2022-02-28 LAB
ALBUMIN SERPL-MCNC: 3.3 G/DL (ref 3.5–5.2)
ALP BLD-CCNC: 110 U/L (ref 40–129)
ALT SERPL-CCNC: 16 U/L (ref 0–40)
ANION GAP SERPL CALCULATED.3IONS-SCNC: 12 MMOL/L (ref 7–16)
ANISOCYTOSIS: ABNORMAL
AST SERPL-CCNC: 23 U/L (ref 0–39)
BASOPHILS ABSOLUTE: 0.06 E9/L (ref 0–0.2)
BASOPHILS RELATIVE PERCENT: 0.9 % (ref 0–2)
BILIRUB SERPL-MCNC: 0.3 MG/DL (ref 0–1.2)
BUN BLDV-MCNC: 61 MG/DL (ref 6–23)
CALCIUM SERPL-MCNC: 12.9 MG/DL (ref 8.6–10.2)
CHLORIDE BLD-SCNC: 100 MMOL/L (ref 98–107)
CO2: 28 MMOL/L (ref 22–29)
CREAT SERPL-MCNC: 7.7 MG/DL (ref 0.7–1.2)
EOSINOPHILS ABSOLUTE: 0.07 E9/L (ref 0.05–0.5)
EOSINOPHILS RELATIVE PERCENT: 1.1 % (ref 0–6)
GFR AFRICAN AMERICAN: 8
GFR NON-AFRICAN AMERICAN: 7 ML/MIN/1.73
GLUCOSE BLD-MCNC: 122 MG/DL (ref 74–99)
HCT VFR BLD CALC: 31 % (ref 37–54)
HEMOGLOBIN: 9.5 G/DL (ref 12.5–16.5)
HYPOCHROMIA: ABNORMAL
IMMATURE GRANULOCYTES #: 0.05 E9/L
IMMATURE GRANULOCYTES %: 0.8 % (ref 0–5)
LYMPHOCYTES ABSOLUTE: 1.23 E9/L (ref 1.5–4)
LYMPHOCYTES RELATIVE PERCENT: 19.4 % (ref 20–42)
MCH RBC QN AUTO: 34.9 PG (ref 26–35)
MCHC RBC AUTO-ENTMCNC: 30.6 % (ref 32–34.5)
MCV RBC AUTO: 114 FL (ref 80–99.9)
MONOCYTES ABSOLUTE: 0.29 E9/L (ref 0.1–0.95)
MONOCYTES RELATIVE PERCENT: 4.6 % (ref 2–12)
NEUTROPHILS ABSOLUTE: 4.64 E9/L (ref 1.8–7.3)
NEUTROPHILS RELATIVE PERCENT: 73.2 % (ref 43–80)
OVALOCYTES: ABNORMAL
PDW BLD-RTO: 16.8 FL (ref 11.5–15)
PLATELET # BLD: 137 E9/L (ref 130–450)
PMV BLD AUTO: 9.4 FL (ref 7–12)
POIKILOCYTES: ABNORMAL
POLYCHROMASIA: ABNORMAL
POTASSIUM SERPL-SCNC: 4.6 MMOL/L (ref 3.5–5)
RBC # BLD: 2.72 E12/L (ref 3.8–5.8)
SODIUM BLD-SCNC: 140 MMOL/L (ref 132–146)
TOTAL PROTEIN: 6.3 G/DL (ref 6.4–8.3)
WBC # BLD: 6.3 E9/L (ref 4.5–11.5)

## 2022-03-03 LAB — CYCLOSPORINE A: 186.3 NG/ML

## 2022-03-05 ENCOUNTER — PATIENT MESSAGE (OUTPATIENT)
Dept: PRIMARY CARE CLINIC | Age: 72
End: 2022-03-05

## 2022-03-06 LAB
CMV DNA QNT PCR: <2.6 LOG CPY/ML
CMV DNA QUANTATATIVE INTERPRETATION: <2.4 LOG IU/ML
CMV DNA QUANTATATIVE INTERPRETATION: NOT DETECTED
CMV DNA QUANTITATIVE: <227 IU/ML
CMV SOURCE: NORMAL
CMVQ COPY/ML: <390 CPY/ML

## 2022-03-07 RX ORDER — ISOSORBIDE DINITRATE 10 MG/1
5 TABLET ORAL 3 TIMES DAILY
Qty: 90 TABLET | Status: CANCELLED | OUTPATIENT
Start: 2022-03-07

## 2022-03-07 RX ORDER — ISOSORBIDE MONONITRATE 30 MG/1
30 TABLET, EXTENDED RELEASE ORAL 2 TIMES DAILY
Qty: 180 TABLET | Refills: 1 | Status: SHIPPED
Start: 2022-03-07 | End: 2022-05-05

## 2022-03-07 NOTE — TELEPHONE ENCOUNTER
From: Lauryn Ryan  To: Dr. Caitlyn Mills  Sent: 3/5/2022 11:12 AM EST  Subject: Renew prescription for Muna Rubi    Isosorbide monitrate ER 24hr 30 mg, take one tab 2 times a day. Karen machuca said doesn't have a refill.

## 2022-03-08 LAB
EBV, QUANT. COPY/ML: <390 CPY/ML
EBV, QUANT. INTERP: NOT DETECTED
EBV, QUANT. LOG: <2.6 LOG
EBV, QUANT. SOURCE: NORMAL

## 2022-03-23 ENCOUNTER — TELEPHONE (OUTPATIENT)
Dept: PRIMARY CARE CLINIC | Age: 72
End: 2022-03-23

## 2022-03-23 NOTE — TELEPHONE ENCOUNTER
The pt is calling to schedule a hospital follow up I offered him the one for tomorrow but he is at dialysis Tuesday and Thursdays till 1:30 and Monday he goes at noon

## 2022-03-29 ENCOUNTER — TELEPHONE (OUTPATIENT)
Dept: PRIMARY CARE CLINIC | Age: 72
End: 2022-03-29

## 2022-03-29 NOTE — TELEPHONE ENCOUNTER
----- Message from Woodroe Nissen sent at 3/29/2022  9:00 AM EDT -----  Subject: Message to Provider    QUESTIONS  Information for Provider? PTs wife Golden Jacques called in and canceled his APPT as   this PT fell and broke his hip and Is currently in the hospital. Please   call wife if you have any questions.  ---------------------------------------------------------------------------  --------------  CALL BACK INFO  What is the best way for the office to contact you? OK to leave message on   voicemail  Preferred Call Back Phone Number? [phone]  ---------------------------------------------------------------------------  --------------  SCRIPT ANSWERS  Relationship to Patient? Other  Representative Name? Golden Jacques (wife)  Is the Representative on the appropriate HIPAA document in Epic?  Yes

## 2022-03-30 LAB — POTASSIUM SERPL-SCNC: 5.4 MEQ/L (ref 3.6–5)

## 2022-04-21 ENCOUNTER — TELEPHONE (OUTPATIENT)
Dept: PRIMARY CARE CLINIC | Age: 72
End: 2022-04-21

## 2022-05-04 ENCOUNTER — OFFICE VISIT (OUTPATIENT)
Dept: PRIMARY CARE CLINIC | Age: 72
End: 2022-05-04
Payer: MEDICARE

## 2022-05-04 VITALS
TEMPERATURE: 97 F | HEART RATE: 65 BPM | DIASTOLIC BLOOD PRESSURE: 60 MMHG | HEIGHT: 70 IN | SYSTOLIC BLOOD PRESSURE: 126 MMHG | BODY MASS INDEX: 27.35 KG/M2 | OXYGEN SATURATION: 95 % | WEIGHT: 191 LBS

## 2022-05-04 DIAGNOSIS — M15.9 GENERALIZED OSTEOARTHRITIS: ICD-10-CM

## 2022-05-04 DIAGNOSIS — Z96.642 STATUS POST TOTAL REPLACEMENT OF LEFT HIP: ICD-10-CM

## 2022-05-04 DIAGNOSIS — I50.32 CHRONIC DIASTOLIC CHF (CONGESTIVE HEART FAILURE) (HCC): Primary | ICD-10-CM

## 2022-05-04 DIAGNOSIS — S72.002D CLOSED FRACTURE OF LEFT HIP WITH ROUTINE HEALING, SUBSEQUENT ENCOUNTER: ICD-10-CM
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
## 2022-05-04 DIAGNOSIS — Z09 HOSPITAL DISCHARGE FOLLOW-UP: ICD-10-CM

## 2022-05-04 DIAGNOSIS — I25.118 CORONARY ARTERY DISEASE OF NATIVE HEART WITH STABLE ANGINA PECTORIS, UNSPECIFIED VESSEL OR LESION TYPE (HCC): ICD-10-CM

## 2022-05-04 PROCEDURE — 99214 OFFICE O/P EST MOD 30 MIN: CPT | Performed by: FAMILY MEDICINE

## 2022-05-04 PROCEDURE — 1111F DSCHRG MED/CURRENT MED MERGE: CPT | Performed by: FAMILY MEDICINE

## 2022-05-04 RX ORDER — LEVALBUTEROL INHALATION SOLUTION 1.25 MG/3ML
1.25 SOLUTION RESPIRATORY (INHALATION) 3 TIMES DAILY
COMMUNITY
Start: 2022-04-18

## 2022-05-04 RX ORDER — DIMENHYDRINATE 50 MG
TABLET ORAL
COMMUNITY

## 2022-05-04 ASSESSMENT — ENCOUNTER SYMPTOMS
DIARRHEA: 0
CHEST TIGHTNESS: 0
BACK PAIN: 0
COUGH: 0
NAUSEA: 0
VOMITING: 0
CONSTIPATION: 0
ABDOMINAL PAIN: 0
WHEEZING: 0
SHORTNESS OF BREATH: 0

## 2022-05-18 ENCOUNTER — OFFICE VISIT (OUTPATIENT)
Dept: FAMILY MEDICINE CLINIC | Age: 72
End: 2022-05-18
Payer: MEDICARE

## 2022-05-18 VITALS
OXYGEN SATURATION: 99 % | WEIGHT: 191 LBS | BODY MASS INDEX: 27.41 KG/M2 | DIASTOLIC BLOOD PRESSURE: 76 MMHG | SYSTOLIC BLOOD PRESSURE: 118 MMHG | TEMPERATURE: 97.2 F | HEART RATE: 81 BPM

## 2022-05-18 DIAGNOSIS — R05.9 COUGH: ICD-10-CM

## 2022-05-18 DIAGNOSIS — J06.9 ACUTE UPPER RESPIRATORY INFECTION, UNSPECIFIED: ICD-10-CM

## 2022-05-18 DIAGNOSIS — J20.9 ACUTE BRONCHITIS, UNSPECIFIED ORGANISM: Primary | ICD-10-CM

## 2022-05-18 LAB
Lab: NORMAL
QC PASS/FAIL: NORMAL
SARS-COV-2 RDRP RESP QL NAA+PROBE: NEGATIVE

## 2022-05-18 PROCEDURE — G8427 DOCREV CUR MEDS BY ELIG CLIN: HCPCS | Performed by: PHYSICIAN ASSISTANT

## 2022-05-18 PROCEDURE — 4040F PNEUMOC VAC/ADMIN/RCVD: CPT | Performed by: PHYSICIAN ASSISTANT

## 2022-05-18 PROCEDURE — 1123F ACP DISCUSS/DSCN MKR DOCD: CPT | Performed by: PHYSICIAN ASSISTANT

## 2022-05-18 PROCEDURE — 87635 SARS-COV-2 COVID-19 AMP PRB: CPT | Performed by: PHYSICIAN ASSISTANT

## 2022-05-18 PROCEDURE — 3006F CXR DOC REV: CPT | Performed by: PHYSICIAN ASSISTANT

## 2022-05-18 PROCEDURE — 99214 OFFICE O/P EST MOD 30 MIN: CPT | Performed by: PHYSICIAN ASSISTANT

## 2022-05-18 PROCEDURE — 1036F TOBACCO NON-USER: CPT | Performed by: PHYSICIAN ASSISTANT

## 2022-05-18 PROCEDURE — G8417 CALC BMI ABV UP PARAM F/U: HCPCS | Performed by: PHYSICIAN ASSISTANT

## 2022-05-18 PROCEDURE — 3017F COLORECTAL CA SCREEN DOC REV: CPT | Performed by: PHYSICIAN ASSISTANT

## 2022-05-18 RX ORDER — DOXYCYCLINE HYCLATE 100 MG
100 TABLET ORAL 2 TIMES DAILY
Qty: 20 TABLET | Refills: 0 | Status: SHIPPED | OUTPATIENT
Start: 2022-05-18 | End: 2022-05-28

## 2022-05-18 RX ORDER — BENZONATATE 100 MG/1
100 CAPSULE ORAL 3 TIMES DAILY PRN
Qty: 21 CAPSULE | Refills: 0 | Status: SHIPPED | OUTPATIENT
Start: 2022-05-18 | End: 2022-05-25

## 2022-05-18 NOTE — PROGRESS NOTES
22  Chauncey Bennett : 1950 Sex: male  Age 67 y.o. Subjective:  Chief Complaint   Patient presents with    Cough    Congestion    Drainage         HPI:   Chauncey Bennett , 67 y.o. male presents to express care for evaluation of cough, congestion, drainage    HPI  48-year-old male presents to express care with a history of bilateral lung transplant at [facility], pulmonary fibrosis, end-stage renal disease with hemodialysis for evaluation cough, congestion, drainage. The patient has had the symptoms ongoing since . The patient has not any fever, chills. The cough is started somewhat abruptly. The patient does do dialysis Tuesday, Thursday, Saturday. The patient will do an additional day on Monday if needed. The patient does have a history of CHF. He has not any fever, chills. No loss of smell, taste. The patient is vaccinated. Patient did have pneumonia back in December. The patient was placed on the doxycycline at that time. The patient has recently had a total hip replacement in March from a fall. ROS:   Unless otherwise stated in this report the patient's positive and negative responses for review of systems for constitutional, eyes, ENT, cardiovascular, respiratory, gastrointestinal, neurological, , musculoskeletal, and integument systems and related systems to the presenting problem are either stated in the history of present illness or were not pertinent or were negative for the symptoms and/or complaints related to the presenting medical problem. Positives and pertinent negatives as per HPI. All others reviewed and are negative.       PMH:     Past Medical History:   Diagnosis Date    Allergic rhinitis     CAD (coronary artery disease)     [place] CABS X 2 17 [facility]    COPD (chronic obstructive pulmonary disease) ([facility])     O2 3L [facility]    Emphysema (subcutaneous) (surgical) resulting from a procedure     Upper Lobes    Emphysema lung (HCC)     GERD (gastroesophageal reflux disease)     Hyperlipidemia     Hypertension     IFG (impaired fasting glucose)     Obesity     Pulmonary fibrosis (HCC)     Benign Nodules - restrictive    Vitamin D insufficiency        Past Surgical History:   Procedure Laterality Date    APPENDECTOMY      BRONCHOSCOPY  05/12/2017    Bedside - 6/18/18 - [facility] - [facility] - 8/13/18    CABG WITH AORTIC VALVE REPAIR  04/20/2017    X 2 [facility]    CATARACT REMOVAL WITH IMPLANT Bilateral     OS 11/7/18, OD 11/19/18 - [place]    CHOLECYSTECTOMY      DIAPHRAGM SURGERY Left 08/11/2017    LUNG DECORTICATION      LUNG TRANSPLANT, 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    LUNG TRANSPLANT, DOUBLE      THORACOTOMY      TRACHEOSTOMY  05/01/2017    [facility]      [facility]       Family History   Problem Relation Age of Onset    Heart Failure Mother     Other Father         AAA    Cancer Brother         Lung    Coronary Art Dis Brother     Heart Failure Brother         Methothelioma       Medications:     Current Outpatient Medications:     doxycycline hyclate (VIBRA-TABS) 100 MG tablet, Take 1 tablet by mouth 2 times daily for 10 days, Disp: 20 tablet, Rfl: 0    benzonatate (TESSALON) 100 MG capsule, Take 1 capsule by mouth 3 times daily as needed for Cough, Disp: 21 capsule, Rfl: 0    isosorbide mononitrate (IMDUR) 30 MG extended release tablet, Take 1 tablet by mouth daily, Disp: 90 tablet, Rfl: 1    Coenzyme Q10 (CO Q-10) 100 MG CAPS, Take by mouth, Disp: , Rfl:     levalbuterol (XOPENEX) 1.25 MG/3ML nebulizer solution, Inhale 1.25 mg into the lungs 3 times daily, Disp: , Rfl:     carvedilol (COREG) 6.25 MG tablet, Take 1 tablet by mouth daily, Disp: 90 tablet, Rfl: 1    lidocaine-prilocaine (EMLA) 2.5-2.5 % cream, APPLY SMALL AMOUNT TO ACCESS SITE (AVF) 1 HOUR BEFORE DIALYSIS.  COVER WITH OCCLUSIVE DRESSING (SARAN WRAP), Disp: , Rfl:     pantoprazole (PROTONIX) 40 MG tablet, TAKE ONE TABLET BY MOUTH TWO TIMES A DAY BEFORE MEALS (6AM AND 4PM), Disp: , Rfl:     mirtazapine (REMERON SOL-TAB) 15 MG disintegrating tablet, DISSOLVE ONE TABLET IN MOUTH DAILY AT BEDTIME, Disp: , Rfl:     rosuvastatin (CRESTOR) 5 MG tablet, TAKE ONE TABLET BY MOUTH EVERY DAY, Disp: , Rfl:     famotidine (PEPCID) 40 MG tablet, Take 40 mg by mouth daily, Disp: , Rfl:     clopidogrel (PLAVIX) 75 MG tablet, Take 75 mg by mouth daily, Disp: , Rfl:     SPS 15 GM/60ML suspension, TAKE 60 ML ON SUNDAY FOR 4 WEEKS, Disp: , Rfl:     ondansetron (ZOFRAN) 4 MG tablet, Take 4 mg by mouth every 8 hours as needed, Disp: , Rfl:     midodrine (PROAMATINE) 10 MG tablet, TAKE 1 TABLET BY MOUTH EVERY MONDAY WEDNESDAY  AND FRIDAY BEFORE DIALYSIS, Disp: , Rfl:     calcitRIOL (ROCALTROL) 0.25 MCG capsule, Calcitriol Calcitriol (Rocaltrol) 0.25 MCG Capsule Active 0.25 MCG PO Daily February 5th, 2021 8:49pm 02-  [facility] ([id number]), Disp: , Rfl:     FLUoxetine (PROZAC) 40 MG capsule, TAKE ONE CAPSULE BY MOUTH DAILY, Disp: 90 capsule, Rfl: 1    acetaminophen (TYLENOL) 325 MG tablet, Take 650 mg by mouth every 6 hours as needed for Pain, Disp: , Rfl:     calcium acetate 667 MG TABS, Take 667 mg by mouth every 12 hours, Disp: , Rfl:     vitamin D 25 MCG (1000 UT) CAPS, Take 2,000 Units by mouth daily, Disp: , Rfl:     sodium chloride, Inhalant, 7 % nebulizer solution, Take 4 mLs by nebulization as needed for Other, Disp: , Rfl:     sulfamethoxazole-trimethoprim (BACTRIM;SEPTRA) 400-80 MG per tablet, Take 1 tablet by mouth, Disp: , Rfl:     isosorbide dinitrate (ISORDIL) 10 MG tablet, Take 5 mg by mouth 3 times daily, Disp: , Rfl:     folic acid (FOLVITE) 526 MCG tablet, Take 400 mcg by mouth daily, Disp: , Rfl:     aspirin 81 MG tablet, Take 81 mg by mouth daily, Disp: , Rfl:     fluticasone (FLONASE) 50 MCG/ACT nasal spray, 1 spray by Each Nostril route daily, Disp: , Rfl:     cycloSPORINE (SANDIMMUNE) 100 MG capsule, Take 100 mg by mouth 2 times daily, Disp: , Rfl:     cycloSPORINE (SANDIMMUNE) 25 MG capsule, Take 25 mg by mouth daily, Disp: , Rfl:     valGANciclovir (VALCYTE) 450 MG tablet, Take 450 mg by mouth, Disp: , Rfl:     calcium carbonate (TUMS) 500 MG chewable tablet, Take 2 tablets by mouth daily, Disp: , Rfl:     predniSONE (DELTASONE) 5 MG tablet, Take 5 mg by mouth daily, Disp: , Rfl:     nitroGLYCERIN (NITROSTAT) 0.4 MG SL tablet, Place 0.4 mg under the tongue every 5 minutes as needed for Chest pain up to max of 3 total doses. If no relief after 1 dose, call 911., Disp: , Rfl:     guaiFENesin 400 MG tablet, Take 400 mg by mouth 4 times daily as needed for Cough, Disp: , Rfl:     Simethicone 40 MG STRP, Take by mouth, Disp: , Rfl:     Multiple Vitamins-Minerals (THERAPEUTIC MULTIVITAMIN-MINERALS) tablet, Take 1 tablet by mouth daily, Disp: , Rfl:     Allergies: Allergies   Allergen Reactions    Ceftriaxone Itching     Itching at IV site & red streak along the arm within 5 minutes of infusion      Heparin Other (See Comments)     MEÑO      Lorazepam Other (See Comments)    Tacrolimus Other (See Comments)     Agitation         Social History:     Social History     Tobacco Use    Smoking status: Former Smoker     Packs/day: 2.00     Years: 35.00     Pack years: 70.00     Quit date: 1997     Years since quittin.7    Smokeless tobacco: Never Used    Tobacco comment: Quit 18 years ago   Substance Use Topics    Alcohol use: Never    Drug use: Never       Patient lives at home. Physical Exam:     Vitals:    22 1054   BP: 118/76   Site: Right Upper Arm   Position: Sitting   Pulse: 81   Temp: 97.2 °F (36.2 °C)   TempSrc: Temporal   SpO2: 99%   Weight: 191 lb (86.6 kg)       Exam:  Physical Exam  Nurse's notes and vital signs reviewed. The patient is not hypoxic. ? General: Alert, no acute distress, patient resting comfortably Patient is not toxic or lethargic. Skin: Warm, intact, no pallor noted. There is no evidence of rash at this time. Head: Normocephalic, atraumatic  Eye: Normal conjunctiva  Ears, Nose, Throat: Right tympanic membrane clear, left tympanic membrane clear. No drainage or discharge noted. No pre- or post-auricular tenderness, erythema, or swelling noted. No rhinorrhea or congestion noted. Posterior oropharynx shows no erythema, tonsillar hypertrophy, or exudate. the uvula is midline. No trismus or drooling is noted. Moist mucous membranes. Neck: No anterior/posterior lymphadenopathy noted. No erythema, no masses, no fluctuance or induration noted. No meningeal signs. Cardiovascular: Regular Rate and Rhythm  Respiratory: No acute distress, no wheezing or crackles noted but rhonchi noted throughout, no significant wheezing. Musculoskeletal: The patient has no significant swelling noted, no edema noted, no tenderness. Neurological: A&O x4, normal speech  Psychiatric: Cooperative         Testing:     Results for orders placed or performed in visit on 05/18/22   POCT COVID-19 Rapid, NAAT   Result Value Ref Range    SARS-COV-2, RdRp gene Negative Negative    Lot Number [id number]     QC Pass/Fail pass      XR CHEST STANDARD (2 VW)    Result Date: 5/18/2022  EXAMINATION: TWO XRAY VIEWS OF THE CHEST 5/18/2022 11:11 am COMPARISON: The previous study performed 01/06/2022. HISTORY: ORDERING SYSTEM PROVIDED HISTORY: Cough TECHNOLOGIST PROVIDED HISTORY: Reason for exam:->cough FINDINGS: There is no evidence of acute consolidation or infiltrate. Again noted is bilateral lower lung field pulmonary parenchymal scar formation. There has been an interval increase in linear opacities in the left lower lung field, which may represent further parenchymal scar formation or the presence of atelectasis. There is again mild blunting of both costophrenic angles, indicating either small pleural effusions or pleural reaction/thickening. The cardiac silhouette is within normal limits in size.   The thoracic aorta is again atherosclerotic. The tracheobronchial tree is midline and patent. The patient is again status post cardiac surgery. Surgical clips are again seen in the right upper quadrant. Osteo-degenerative changes are again seen involving the thoracic spine. No acute consolidation or infiltrate. Bilateral lower lung field pulmonary parenchymal scar formation again identified, when compared to the prior study performed 01/06/2022. Interval increase in linear opacities in the left lower lung field, which may represent further parenchymal scar formation or atelectasis. There are again either small bilateral pleural effusions or pleural reaction/thickening. Medical Decision Making:     Vital signs reviewed    Past medical history reviewed. Allergies reviewed. Medications reviewed. Patient on arrival does not appear to be in any apparent distress or discomfort. The patient has been seen and evaluated. The patient does not appear to be toxic or lethargic. The patient had a COVID R DRP gene performed. Patient was sent for chest x-ray. COVID was negative. Chest x-ray shows no acute consolidation or infiltrate. Bilateral lower lung pulmonary parenchymal scar formation again identified when compared to the prior study performed on 1/6/2022. Interval increase in the linear opacities in the left lower lung field, which may represent further parenchymal scar formation or atelectasis. There are again either small bilateral pleural effusions or bilateral pleural reaction/thickening. The patient does not appear to be toxic or lethargic. His vital signs are all stable. Pulse ox is 99%. We will treat the patient with doxycycline. The patient is already increased his prednisone. We will give the patient Tessalon Perles. The patient follow-up with PCP. Patient is to return if any of the signs or symptoms change or worsen. Patient was comfortable with the plan.     The patient was educated on the proper dosage of motrin and tylenol and the appropriate intervals of each. The patient is to increase fluid intake over the next several days. The patient is to use OTC decongestant as needed. The patient is to return to express care or go directly to the emergency department should any of the signs or symptoms worsen. The patient is to followup with primary care physician in 2-3 days for repeat evaluation. The patient has no other questions or concerns at this time the patient will be discharged home. Clinical Impression:   Enid Celeste was seen today for cough, congestion and drainage. Diagnoses and all orders for this visit:    Acute bronchitis, unspecified organism    Cough  -     POCT COVID-19 Rapid, NAAT  -     XR CHEST STANDARD (2 VW); Future    Acute upper respiratory infection, unspecified    Other orders  -     doxycycline hyclate (VIBRA-TABS) 100 MG tablet; Take 1 tablet by mouth 2 times daily for 10 days  -     benzonatate (TESSALON) 100 MG capsule; Take 1 capsule by mouth 3 times daily as needed for Cough        The patient is to call for any concerns or return if any of the signs or symptoms worsen. The patient is to follow-up with PCP in the next 2-3 days for repeat evaluation repeat assessment or go directly to the emergency department.      SIGNATURE: Josef Ingram III, PA-C

## 2022-05-25 ENCOUNTER — TELEPHONE (OUTPATIENT)
Dept: PRIMARY CARE CLINIC | Age: 72
End: 2022-05-25

## 2022-05-25 ENCOUNTER — OFFICE VISIT (OUTPATIENT)
Dept: PRIMARY CARE CLINIC | Age: 72
End: 2022-05-25
Payer: MEDICARE

## 2022-05-25 VITALS
DIASTOLIC BLOOD PRESSURE: 58 MMHG | BODY MASS INDEX: 27.4 KG/M2 | HEIGHT: 70 IN | SYSTOLIC BLOOD PRESSURE: 116 MMHG | HEART RATE: 70 BPM | WEIGHT: 191.4 LBS | TEMPERATURE: 96.9 F | OXYGEN SATURATION: 94 %

## 2022-05-25 DIAGNOSIS — J40 BRONCHITIS: Primary | ICD-10-CM

## 2022-05-25 DIAGNOSIS — I50.32 CHRONIC DIASTOLIC CHF (CONGESTIVE HEART FAILURE) (HCC): ICD-10-CM

## 2022-05-25 DIAGNOSIS — F51.01 PRIMARY INSOMNIA: ICD-10-CM

## 2022-05-25 DIAGNOSIS — Z94.2 LUNG TRANSPLANT STATUS, BILATERAL (HCC): ICD-10-CM

## 2022-05-25 PROCEDURE — 1123F ACP DISCUSS/DSCN MKR DOCD: CPT | Performed by: FAMILY MEDICINE

## 2022-05-25 PROCEDURE — 99214 OFFICE O/P EST MOD 30 MIN: CPT | Performed by: FAMILY MEDICINE

## 2022-05-25 PROCEDURE — G8417 CALC BMI ABV UP PARAM F/U: HCPCS | Performed by: FAMILY MEDICINE

## 2022-05-25 PROCEDURE — G8427 DOCREV CUR MEDS BY ELIG CLIN: HCPCS | Performed by: FAMILY MEDICINE

## 2022-05-25 PROCEDURE — 3017F COLORECTAL CA SCREEN DOC REV: CPT | Performed by: FAMILY MEDICINE

## 2022-05-25 PROCEDURE — 1036F TOBACCO NON-USER: CPT | Performed by: FAMILY MEDICINE

## 2022-05-25 RX ORDER — SODIUM CHLORIDE FOR INHALATION 7 %
4 VIAL, NEBULIZER (ML) INHALATION PRN
Qty: 4 ML | Refills: 11 | Status: SHIPPED
Start: 2022-05-25 | End: 2022-05-25 | Stop reason: SDUPTHER

## 2022-05-25 RX ORDER — TRAZODONE HYDROCHLORIDE 50 MG/1
50 TABLET ORAL NIGHTLY
Qty: 90 TABLET | Refills: 1 | Status: SHIPPED | OUTPATIENT
Start: 2022-05-25

## 2022-05-25 RX ORDER — SODIUM CHLORIDE FOR INHALATION 7 %
4 VIAL, NEBULIZER (ML) INHALATION PRN
Qty: 4 ML | Refills: 11 | Status: CANCELLED | OUTPATIENT
Start: 2022-05-25

## 2022-05-25 RX ORDER — SODIUM CHLORIDE FOR INHALATION 7 %
4 VIAL, NEBULIZER (ML) INHALATION 2 TIMES DAILY PRN
Qty: 720 ML | Refills: 1 | Status: SHIPPED | OUTPATIENT
Start: 2022-05-25 | End: 2022-08-23

## 2022-05-25 RX ORDER — PREDNISONE 1 MG/1
5 TABLET ORAL DAILY
Qty: 90 TABLET | Refills: 1 | Status: SHIPPED | OUTPATIENT
Start: 2022-05-25

## 2022-05-25 RX ORDER — ISOSORBIDE MONONITRATE 30 MG/1
30 TABLET, EXTENDED RELEASE ORAL DAILY
Qty: 90 TABLET | Refills: 1 | Status: SHIPPED | OUTPATIENT
Start: 2022-05-25

## 2022-05-25 ASSESSMENT — PATIENT HEALTH QUESTIONNAIRE - PHQ9
SUM OF ALL RESPONSES TO PHQ QUESTIONS 1-9: 0
SUM OF ALL RESPONSES TO PHQ QUESTIONS 1-9: 0
2. FEELING DOWN, DEPRESSED OR HOPELESS: 0
SUM OF ALL RESPONSES TO PHQ QUESTIONS 1-9: 0
SUM OF ALL RESPONSES TO PHQ QUESTIONS 1-9: 0
SUM OF ALL RESPONSES TO PHQ9 QUESTIONS 1 & 2: 0
1. LITTLE INTEREST OR PLEASURE IN DOING THINGS: 0

## 2022-05-25 ASSESSMENT — ENCOUNTER SYMPTOMS
CONSTIPATION: 0
SHORTNESS OF BREATH: 0
BACK PAIN: 0
DIARRHEA: 0
WHEEZING: 0
NAUSEA: 0
VOMITING: 0
ABDOMINAL PAIN: 0
COUGH: 1
CHEST TIGHTNESS: 0

## 2022-05-25 NOTE — PROGRESS NOTES
22  Juanjose Karimiter : 1950 Sex: male  Age: 67 y.o. Chief Complaint   Patient presents with    Pneumonia     express care follow up    Discuss Medications     pt was taking isordil but is now on imdur and would like to know which is best to continue with     HPI:  67 y.o. male presents today for [facility] follow up and medication refills. Patient's chart, medical, surgical and medication history all reviewed. Express follow up  Date seen in the Express: 22  Symptoms: cough, congestion, drainage  Labs/Imaging: COVID negative, CXR- L lower lung field scarring and small bilateral effusions  Diagnosis: bronchitis   Treatments: Doxycycline, Prednisone and Tessalon    Today, patient notes that he is feeling much better. Swelling in his legs improved. Mucus and cough improved. Has follow up with Pulm in . He has noted issues with insomnia. Using Melatonin and Valerian Root with only minimal relief. ROS:  Review of Systems   Constitutional: Negative for chills, fatigue and fever. Respiratory: Positive for cough. Negative for chest tightness, shortness of breath and wheezing. Cardiovascular: Negative for chest pain, palpitations and leg swelling. Gastrointestinal: Negative for abdominal pain, constipation, diarrhea, nausea and vomiting. Musculoskeletal: Positive for arthralgias and gait problem. Negative for back pain. Skin: Negative for rash. Neurological: Negative for dizziness, weakness and headaches. Hematological: Bruises/bleeds easily. Psychiatric/Behavioral: Positive for sleep disturbance. Negative for dysphoric mood. The patient is nervous/anxious. All other systems reviewed and are negative.      Current Outpatient Medications on File Prior to Visit   Medication Sig Dispense Refill    doxycycline hyclate (VIBRA-TABS) 100 MG tablet Take 1 tablet by mouth 2 times daily for 10 days 20 tablet 0    benzonatate (TESSALON) 100 MG capsule Take 1 capsule by mouth 3 times daily as needed for Cough 21 capsule 0    Coenzyme Q10 (CO Q-10) 100 MG CAPS Take by mouth      levalbuterol (XOPENEX) 1.25 MG/3ML nebulizer solution Inhale 1.25 mg into the lungs 3 times daily      carvedilol (COREG) 6.25 MG tablet Take 1 tablet by mouth daily 90 tablet 1    lidocaine-prilocaine (EMLA) 2.5-2.5 % cream APPLY SMALL AMOUNT TO ACCESS SITE (AVF) 1 HOUR BEFORE DIALYSIS.  COVER WITH OCCLUSIVE DRESSING (SARAN WRAP)      pantoprazole (PROTONIX) 40 MG tablet TAKE ONE TABLET BY MOUTH TWO TIMES A DAY BEFORE MEALS (6AM AND 4PM)      mirtazapine (REMERON SOL-TAB) 15 MG disintegrating tablet DISSOLVE ONE TABLET IN MOUTH DAILY AT BEDTIME      rosuvastatin (CRESTOR) 5 MG tablet TAKE ONE TABLET BY MOUTH EVERY DAY      famotidine (PEPCID) 40 MG tablet Take 40 mg by mouth daily      clopidogrel (PLAVIX) 75 MG tablet Take 75 mg by mouth daily      SPS 15 GM/60ML suspension TAKE 60 ML ON SUNDAY FOR 4 WEEKS      ondansetron (ZOFRAN) 4 MG tablet Take 4 mg by mouth every 8 hours as needed      midodrine (PROAMATINE) 10 MG tablet TAKE 1 TABLET BY MOUTH EVERY MONDAY WEDNESDAY  AND FRIDAY BEFORE DIALYSIS      calcitRIOL (ROCALTROL) 0.25 MCG capsule Calcitriol Calcitriol (Rocaltrol) 0.25 MCG Capsule Active 0.25 MCG PO Daily February 5th, 2021 8:49pm 02-  [facility] ([id number])      FLUoxetine (PROZAC) 40 MG capsule TAKE ONE CAPSULE BY MOUTH DAILY 90 capsule 1    acetaminophen (TYLENOL) 325 MG tablet Take 650 mg by mouth every 6 hours as needed for Pain      calcium acetate 667 MG TABS Take 667 mg by mouth every 12 hours      vitamin D 25 MCG (1000 UT) CAPS Take 2,000 Units by mouth daily      sulfamethoxazole-trimethoprim (BACTRIM;SEPTRA) 400-80 MG per tablet Take 1 tablet by mouth      folic acid (FOLVITE) 947 MCG tablet Take 400 mcg by mouth daily      aspirin 81 MG tablet Take 81 mg by mouth daily      fluticasone (FLONASE) 50 MCG/ACT nasal spray 1 spray by Each Nostril route daily      cycloSPORINE (SANDIMMUNE) 100 MG capsule Take 100 mg by mouth 2 times daily      cycloSPORINE (SANDIMMUNE) 25 MG capsule Take 25 mg by mouth daily      valGANciclovir (VALCYTE) 450 MG tablet Take 450 mg by mouth      calcium carbonate (TUMS) 500 MG chewable tablet Take 2 tablets by mouth daily      nitroGLYCERIN (NITROSTAT) 0.4 MG SL tablet Place 0.4 mg under the tongue every 5 minutes as needed for Chest pain up to max of 3 total doses. If no relief after 1 dose, call 911.  guaiFENesin 400 MG tablet Take 400 mg by mouth 4 times daily as needed for Cough      Simethicone 40 MG STRP Take by mouth      Multiple Vitamins-Minerals (THERAPEUTIC MULTIVITAMIN-MINERALS) tablet Take 1 tablet by mouth daily       No current facility-administered medications on file prior to visit.        Allergies   Allergen Reactions    Ceftriaxone Itching     Itching at IV site & red streak along the arm within 5 minutes of infusion      Heparin Other (See Comments)     MEÑO      Lorazepam Other (See Comments)    Tacrolimus Other (See Comments)     Agitation         Past Medical History:   Diagnosis Date    Allergic rhinitis     CAD (coronary artery disease)     Ishcemia CABS X 2 4/20/17 [facility]    COPD (chronic obstructive pulmonary disease) (HCC)     O2 3L [facility]    Emphysema (subcutaneous) (surgical) resulting from a procedure     Upper Lobes    Emphysema lung ([address]     GERD (gastroesophageal reflux disease)     Hyperlipidemia     Hypertension     IFG (impaired fasting glucose)     Obesity     Pulmonary fibrosis (HCC)     Benign Nodules - restrictive    Vitamin D insufficiency      Past Surgical History:   Procedure Laterality Date    APPENDECTOMY      BRONCHOSCOPY  05/12/2017    Bedside - 6/18/18 - [facility] - [facility] - 8/13/18    CABG WITH AORTIC VALVE REPAIR  04/20/2017    X 2 [facility]    CATARACT REMOVAL WITH IMPLANT Bilateral     OS 11/7/18, OD 11/19/18 - [facility]    CHOLECYSTECTOMY      DIAPHRAGM SURGERY Left 2017    LUNG DECORTICATION      LUNG TRANSPLANT, DOUBLE  2017    LUNG TRANSPLANT, DOUBLE      THORACOTOMY      TRACHEOSTOMY  2017    VOCAL CORD SURGERY      Nodules, Skin Lesions - [facility]     Family History   Problem Relation Age of Onset    Heart Failure Mother     Other Father         AAA    Cancer Brother         Lung    Coronary Art Dis Brother     Heart Failure Brother         Methothelioma     Social History     Socioeconomic History    Marital status:      Spouse name: Not on file    Number of children: Not on file    Years of education: Not on file    Highest education level: Not on file   Occupational History    Not on file   Tobacco Use    Smoking status: Former Smoker     Packs/day: 2.00     Years: 35.00     Pack years: 70.00     Quit date: 1997     Years since quittin.7    Smokeless tobacco: Never Used    Tobacco comment: Quit 18 years ago   Substance and Sexual Activity    Alcohol use: Never    Drug use: Never    Sexual activity: Not on file   Other Topics Concern    Not on file   Social History Narrative    Not on file     Social Determinants of Health     Financial Resource Strain:     Difficulty of Paying Living Expenses: Not on file   Food Insecurity:     Worried About [address] in the Last Year: Not on file    [address] in the Last Year: Not on file   Transportation Needs:     Lack of Transportation (Medical): Not on file    Lack of Transportation (Non-Medical):  Not on file   Physical Activity:     Days of Exercise per Week: Not on file    Minutes of Exercise per Session: Not on file   Stress:     Feeling of Stress : Not on file   Social Connections:     Frequency of Communication with Friends and Family: Not on file    Frequency of Social Gatherings with Friends and Family: Not on file    Attends Latter day Services: Not on file    Active Member of Clubs or Organizations: Not on file    Attends Club or Organization Meetings: Not on file    Marital Status: Not on file   Intimate Partner Violence:     Fear of Current or Ex-Partner: Not on file    Emotionally Abused: Not on file    Physically Abused: Not on file    Sexually Abused: Not on file   Housing Stability:     Unable to Pay for Housing in the Last Year: Not on file    Number of Bang in the Last Year: Not on file    Unstable Housing in the Last Year: Not on file       Vitals:    05/25/22 0954   BP: (!) 116/58   Pulse: 70   Temp: 96.9 °F (36.1 °C)   SpO2: 94%   Weight: 191 lb 6.4 oz (86.8 kg)   Height: 5' 10\" (1.778 m)       Physical Exam:  Physical Exam  Vitals and nursing note reviewed. Constitutional:       General: He is not in acute distress. Appearance: Normal appearance. He is well-developed. He is obese. He is ill-appearing (chronically). HENT:      Head: Normocephalic and atraumatic. Right Ear: Hearing and external ear normal.      Left Ear: Hearing and external ear normal.      Nose:      Comments: Patient wearing mask  Eyes:      General: Lids are normal. No scleral icterus. Extraocular Movements: Extraocular movements intact. Conjunctiva/sclera: Conjunctivae normal.   Neck:      Thyroid: No thyromegaly. Cardiovascular:      Rate and Rhythm: Normal rate and regular rhythm. Heart sounds: Normal heart sounds. No murmur heard. Pulmonary:      Effort: Pulmonary effort is normal. No respiratory distress. Breath sounds: Normal breath sounds. No wheezing. Musculoskeletal:         General: No tenderness or deformity. Normal range of motion. Cervical back: Normal range of motion and neck supple. Right lower leg: No edema. Left lower leg: No edema. Lymphadenopathy:      Cervical: No cervical adenopathy. Skin:     General: Skin is warm and dry. Findings: No bruising or rash. Neurological:      General: No focal deficit present.       Mental Status: He is alert and oriented to person, place, and time. Gait: Gait abnormal (using walker). Psychiatric:         Mood and Affect: Mood and affect normal.         Speech: Speech normal.         Behavior: Behavior normal.         Thought Content:  Thought content normal.         Labs:  CBC with Differential:    Lab Results   Component Value Date    WBC 6.0 04/05/2022    RBC 2.28 04/05/2022    HGB 7.9 04/05/2022    HCT 24.0 04/05/2022     04/05/2022    .0 04/05/2022    MCH 34.7 04/05/2022    MCHC 33.1 04/05/2022    RDW 17.4 04/05/2022    NRBC 0.9 01/18/2022    SEGSPCT 73.7 04/05/2022    LYMPHOPCT 16.0 04/05/2022    MONOPCT 7.8 04/05/2022    BASOPCT 0.7 04/05/2022    MONOSABS 0.5 04/05/2022    LYMPHSABS 1.0 04/05/2022    EOSABS 0.1 04/05/2022    BASOSABS 0.0 04/05/2022     CMP:    Lab Results   Component Value Date     04/05/2022    K 4.4 04/05/2022    CL 99 04/05/2022    CO2 28 04/05/2022    BUN 59 04/05/2022    CREATININE 6.4 04/05/2022    GFRAA 8 02/28/2022    AGRATIO 1.2 04/04/2022    LABGLOM 9 04/05/2022    GLUCOSE 91 04/05/2022    PROT 5.8 04/04/2022    LABALBU 3.2 04/04/2022    CALCIUM 8.3 04/05/2022    BILITOT 1.3 04/04/2022    ALKPHOS 100 04/04/2022    AST 33 04/04/2022    ALT 28 04/04/2022     U/A:  No results found for: NITRITE, COLORU, PROTEINU, PHUR, LABCAST, WBCUA, RBCUA, MUCUS, TRICHOMONAS, YEAST, BACTERIA, CLARITYU, SPECGRAV, LEUKOCYTESUR, UROBILINOGEN, BILIRUBINUR, BLOODU, GLUCOSEU, AMORPHOUS  HgBA1c:    Lab Results   Component Value Date    LABA1C 4.8 03/19/2022     Microalbumen/Creatinine ratio:  No components found for: RUCREAT  FLP:    Lab Results   Component Value Date    TRIG 271 03/19/2022    HDL 32 03/19/2022    LDLCALC 61 01/06/2022    LABVLDL 46 01/06/2022     TSH:    Lab Results   Component Value Date    TSH 1.24 03/19/2022     IRON:    Lab Results   Component Value Date    IRON 36 04/01/2022     Iron Saturation:  No components found for: PERCENTFE  TIBC:    Lab Results   Component Value Date TIBC 221 04/01/2022     FERRITIN:  No results found for: FERRITIN  PSA: No results found for: PSA     Assessment and Plan:  Jen Krause was seen today for pneumonia and discuss medications. Diagnoses and all orders for this visit:    Bronchitis  Symptoms resolving. Discussed talking with pulm about preventative measures. Lung transplant status, bilateral ([facility])  -     sodium chloride, Inhalant, 7 % nebulizer solution; Take 4 mLs by nebulization as needed for Other  -     predniSONE (DELTASONE) 5 MG tablet; Take 1 tablet by mouth daily  Due for refills    Chronic diastolic CHF (congestive heart failure) ([facility])  -     isosorbide mononitrate (IMDUR) 30 MG extended release tablet; Take 1 tablet by mouth daily  Due for refills    Primary insomnia  -     traZODone (DESYREL) 50 MG tablet; Take 1 tablet by mouth nightly  Will try using low dose Trazodone at night. Return in about 10 weeks (around 8/3/2022), or if symptoms worsen or fail to improve, for Chronic medical conditions.       Seen By:  Hung Haney,

## 2022-05-25 NOTE — TELEPHONE ENCOUNTER
New Rx sent None     Minutes per session: None    Stress: None   Relationships    Social connections     Talks on phone: None     Gets together: None     Attends Oriental orthodox service: None     Active member of club or organization: None     Attends meetings of clubs or organizations: None     Relationship status: None    Intimate partner violence     Fear of current or ex partner: None     Emotionally abused: None     Physically abused: None     Forced sexual activity: None   Other Topics Concern    None   Social History Narrative    None        Review of Systems   10 total systems reviewed and found to be negative unless otherwise noted in HPI     Physical Exam   /83   Pulse 83   Temp 98.1 °F (36.7 °C) (Temporal)   Resp 18   Ht 5' 6\" (1.676 m)   Wt 181 lb 7 oz (82.3 kg)   SpO2 97%   BMI 29.28 kg/m²      CONSTITUTIONAL: Well appearing, in no acute distress   HEAD: atraumatic, normocephalic   EYES: PERRL, No injection, discharge or scleral icterus. ENT: Moist mucous membranes. NECK: Normal ROM, NO LAD   CARDIOVASCULAR: Regular rate and rhythm. No murmurs or gallop. PULMONARY/CHEST: Airway patent. No retractions. Breath sounds clear with good air entry bilaterally. ABDOMEN: Soft, Non-distended and non-tender, without guarding or rebound. SKIN: Acyanotic, warm, dry   MUSCULOSKELETAL: No swelling, tenderness or deformity   NEUROLOGICAL: Awake and oriented x 3. Pulses intact. Grossly nonfocal   Nursing note and vitals reviewed.      ED Course & Medical Decision Making   Medications   iopamidol (ISOVUE-370) 76 % injection 75 mL (75 mLs Intravenous Given 12/30/20 1949)   potassium chloride (KLOR-CON) extended release tablet 40 mEq (40 mEq Oral Given 12/30/20 2116)   levoFLOXacin (LEVAQUIN) tablet 750 mg (750 mg Oral Given 12/30/20 2116)      Labs Reviewed   CBC WITH AUTO DIFFERENTIAL - Abnormal; Notable for the following components:       Result Value    Hemoglobin 10.8 (*)     Hematocrit 34.3 (*)     MCH 25.6 reflecting multifocal airspace disease. Xr Chest (2 Vw)    Result Date: 12/30/2020  EXAMINATION: TWO XRAY VIEWS OF THE CHEST 12/30/2020 6:01 pm COMPARISON: None. HISTORY: ORDERING SYSTEM PROVIDED HISTORY: Shortness of breath TECHNOLOGIST PROVIDED HISTORY: Reason for exam:->Shortness of breath Reason for Exam: Shortness of Breath Acuity: Acute Type of Exam: Initial FINDINGS: HEART/MEDIASTINUM: The cardiomediastinal silhouette is within normal limits. PLEURA/LUNGS: There are subtle patchy opacities in the bilateral lungs more peripherally, right greater than left. There are no pleural effusions. There is no appreciable pneumothorax. BONES/SOFT TISSUE: No acute abnormality. Subtle patchy opacities noted in the in the bilateral lungs in the more peripheral pattern reflecting multifocal airspace disease. EKG INTERPRETATION:  EKG by my preliminary interpretation shows sinus rhythm with rate of 94, normal axis, normal intervals, with no ST changes indicative of ischemia at this time. PROCEDURES:   Procedures    ASSESSMENT AND PLAN:  Bryan Garcia is a 58 y.o. male who presented to the emergency room with complaint of shortness of breath. His exam was unremarkable vitals within normal limits. Work up was initiated including a CT PE which was unremarkable for PE however concerning for pneumonia secondary to COVID-19. EKG and troponin was unremarkable ruling out ACS. Given the fact that patient is hemodynamic stable, no respiratory distress and this findings on the CT, he was discharged home with Levaquin and recommendation to follow-up with primary care doctor. ClINICAL IMPRESSION:  1. Dyspnea and respiratory abnormalities    2. Pneumonia due to organism    3.  COVID-19 virus detected          PATIENT REFERRED TO:  Doroteo Galeazzi, MD  [address] [place] [address]  [phone]    Schedule an appointment as soon as possible for a visit in 2 days        DISCHARGE MEDICATIONS:  Discharge Medication List as of 12/30/2020  9:11 PM      START taking these medications    Details   levoFLOXacin (LEVAQUIN) 750 MG tablet Take 1 tablet by mouth daily for 5 days, Disp-5 tablet, R-0Print           DISCONTINUED MEDICATIONS:  Discharge Medication List as of 12/30/2020  9:11 PM        DISPOSITION Decision To Discharge 12/30/2020 09:00:18 PM  -We have instructed the patient, Pancho Abraham) to return to the ED or call him PCP if his pain/symptoms worsen. -Findings and recommendations explained to patient. He expressed understanding and agreed with the plan. Emma Douglass MD (electronically signed)  1/2/2021  _________________________________________________________________________________________  _________________________________________________________________________________________  This record is transcribed utilizing voice recognition technology. There are inherent limitations in this technology. In addition, there may be limitations in editing of this report. If there are any discrepancies, please contact me directly.         Emma Douglass MD  01/02/21 [id number]

## 2022-05-25 NOTE — TELEPHONE ENCOUNTER
Pharmacy calling about sodium chloride script will need prior auth through medicare. Qty 4ml is one dose and needing a frequency.

## 2022-06-06 ENCOUNTER — PATIENT MESSAGE (OUTPATIENT)
Dept: PRIMARY CARE CLINIC | Age: 72
End: 2022-06-06

## 2022-06-06 DIAGNOSIS — I50.32 CHRONIC DIASTOLIC CHF (CONGESTIVE HEART FAILURE) (HCC): ICD-10-CM

## 2022-06-06 RX ORDER — CARVEDILOL 6.25 MG/1
6.25 TABLET ORAL DAILY
Qty: 90 TABLET | Refills: 1 | Status: SHIPPED | OUTPATIENT
Start: 2022-06-06

## 2022-06-06 NOTE — TELEPHONE ENCOUNTER
From: Castro Hudson  To: Dr. David Males  Sent: 6/6/2022 10:47 AM EDT  Subject: Coreg refill    [facility] said\"don't have a refill order. Sorry we missed this at our appointment. Please refill- no muscle problem noted. Thanks Updated on eta 130a for transport.       Jessica Castillo RN  08/13/21 0965

## 2022-08-03 ENCOUNTER — OFFICE VISIT (OUTPATIENT)
Dept: PRIMARY CARE CLINIC | Age: 72
End: 2022-08-03
Payer: MEDICARE

## 2022-08-03 VITALS
WEIGHT: 189 LBS | BODY MASS INDEX: 27.06 KG/M2 | TEMPERATURE: 98.3 F | DIASTOLIC BLOOD PRESSURE: 54 MMHG | OXYGEN SATURATION: 96 % | HEIGHT: 70 IN | HEART RATE: 72 BPM | SYSTOLIC BLOOD PRESSURE: 112 MMHG

## 2022-08-03 DIAGNOSIS — Z99.2 DEPENDENCE ON RENAL DIALYSIS (HCC): ICD-10-CM

## 2022-08-03 DIAGNOSIS — Z95.1 S/P CABG X 2: ICD-10-CM

## 2022-08-03 DIAGNOSIS — I10 PRIMARY HYPERTENSION: Primary | ICD-10-CM

## 2022-08-03 DIAGNOSIS — I50.32 CHRONIC DIASTOLIC CHF (CONGESTIVE HEART FAILURE) (HCC): ICD-10-CM

## 2022-08-03 DIAGNOSIS — Z94.2 LUNG TRANSPLANT STATUS, BILATERAL (HCC): ICD-10-CM

## 2022-08-03 DIAGNOSIS — E78.2 MIXED HYPERLIPIDEMIA: ICD-10-CM

## 2022-08-03 DIAGNOSIS — Z99.2 STAGE 5 CHRONIC KIDNEY DISEASE ON DIALYSIS (HCC): ICD-10-CM

## 2022-08-03 DIAGNOSIS — N18.6 STAGE 5 CHRONIC KIDNEY DISEASE ON DIALYSIS (HCC): ICD-10-CM

## 2022-08-03 PROCEDURE — G8427 DOCREV CUR MEDS BY ELIG CLIN: HCPCS | Performed by: FAMILY MEDICINE

## 2022-08-03 PROCEDURE — 1036F TOBACCO NON-USER: CPT | Performed by: FAMILY MEDICINE

## 2022-08-03 PROCEDURE — 1123F ACP DISCUSS/DSCN MKR DOCD: CPT | Performed by: FAMILY MEDICINE

## 2022-08-03 PROCEDURE — G8417 CALC BMI ABV UP PARAM F/U: HCPCS | Performed by: FAMILY MEDICINE

## 2022-08-03 PROCEDURE — 99214 OFFICE O/P EST MOD 30 MIN: CPT | Performed by: FAMILY MEDICINE

## 2022-08-03 PROCEDURE — 3017F COLORECTAL CA SCREEN DOC REV: CPT | Performed by: FAMILY MEDICINE

## 2022-08-03 ASSESSMENT — ENCOUNTER SYMPTOMS
WHEEZING: 0
SHORTNESS OF BREATH: 0
CONSTIPATION: 0
BACK PAIN: 0
COUGH: 0
NAUSEA: 0
VOMITING: 0
DIARRHEA: 0
ABDOMINAL PAIN: 0

## 2022-08-03 NOTE — PROGRESS NOTES
8/3/22  Kennon Saint : 1950 Sex: male  Age: 67 y.o. Chief Complaint   Patient presents with    Discuss Medications     Patient is taking rosuvastatin and is having a lot of leg weakness      HPI:  67 y.o. male presents today for 3 month(s) follow up of chronic medical conditions. Patient's chart, medical, surgical and medication history all reviewed. Patient following with [facility] for status post lung transplant therapies. Now on waiting list for kidney transplants. Has appt through [address] [place] [address] to discuss transplant. Undergoing dialysis currently. Today, he notes that he has been having more difficulty with weakness and near falls. Unsteady gait. Hypertension   The patient presents today for follow up of HTN. The problem is well controlled. Risk factors for coronary artery disease include Age > 27, male and HTN. Current treatments include Imdur, Carvedilol. The patient is compliant all of the time. Lifestyle changes the patient has made include  none . Today the patient is complaining of none. Hyperlipidemia  The 10-year ASCVD risk score (Suhail Ramirez., et al., 2013) is: 25%    Values used to calculate the score:      Age: 67 years      Sex: Male      Is Non- : No      Diabetic: No      Tobacco smoker: No      Systolic Blood Pressure: 954 mmHg      Is BP treated: Yes      HDL Cholesterol: 32 mg/dL      Total Cholesterol: 226 mg/dL    Anxiety  Patient complains of evaluation of anxiety disorder and panic attacks. He has the following anxiety symptoms: feelings of losing control, irritable, racing thoughts. Onset of symptoms was approximately several years ago, gradually worsening since that time. He denies current suicidal and homicidal ideation. Risk factors: negative life event - patient has been under increasing stress due to chronic medical conditions. Has had a lung transplant and is now waiting for kidney transplant. Having panic attacks prior to HD . Previous treatment includes Ativan, BuSpar, Prozac, Xanax, Zoloft and Zyprexa  and  no therapy . He complains of the following side effects from the treatment:  hallucinations  with benzos. ROS:  Review of Systems   Constitutional:  Negative for chills, fatigue and fever. Respiratory:  Negative for cough, shortness of breath and wheezing. Cardiovascular:  Negative for chest pain, palpitations and leg swelling. Gastrointestinal:  Negative for abdominal pain, constipation, diarrhea, nausea and vomiting. Musculoskeletal:  Positive for gait problem. Negative for arthralgias and back pain. Skin:  Negative for rash. Neurological:  Positive for weakness. Negative for dizziness and headaches. Hematological:  Bruises/bleeds easily. Psychiatric/Behavioral:  Positive for sleep disturbance. Negative for dysphoric mood. The patient is nervous/anxious. All other systems reviewed and are negative. Current Outpatient Medications on File Prior to Visit   Medication Sig Dispense Refill    carvedilol (COREG) 6.25 MG tablet Take 1 tablet by mouth daily 90 tablet 1    isosorbide mononitrate (IMDUR) 30 MG extended release tablet Take 1 tablet by mouth daily 90 tablet 1    predniSONE (DELTASONE) 5 MG tablet Take 1 tablet by mouth daily 90 tablet 1    traZODone (DESYREL) 50 MG tablet Take 1 tablet by mouth nightly 90 tablet 1    sodium chloride, Inhalant, 7 % nebulizer solution Take 4 mLs by nebulization 2 times daily as needed for Other (Congestion) 720 mL 1    Coenzyme Q10 (CO Q-10) 100 MG CAPS Take by mouth      levalbuterol (XOPENEX) 1.25 MG/3ML nebulizer solution Inhale 1.25 mg into the lungs 3 times daily      lidocaine-prilocaine (EMLA) 2.5-2.5 % cream APPLY SMALL AMOUNT TO ACCESS SITE (AVF) 1 HOUR BEFORE DIALYSIS.  COVER WITH OCCLUSIVE DRESSING (SARAN WRAP)      pantoprazole (PROTONIX) 40 MG tablet TAKE ONE TABLET BY MOUTH TWO TIMES A DAY BEFORE MEALS (6AM AND 4PM)      mirtazapine (REMERON SOL-TAB) 15 MG disintegrating tablet DISSOLVE ONE TABLET IN MOUTH DAILY AT BEDTIME      rosuvastatin (CRESTOR) 5 MG tablet TAKE ONE TABLET BY MOUTH EVERY DAY      famotidine (PEPCID) 40 MG tablet Take 40 mg by mouth daily      clopidogrel (PLAVIX) 75 MG tablet Take 75 mg by mouth daily      SPS 15 GM/60ML suspension TAKE 60 ML ON SUNDAY FOR 4 WEEKS      ondansetron (ZOFRAN) 4 MG tablet Take 4 mg by mouth every 8 hours as needed      midodrine (PROAMATINE) 10 MG tablet TAKE 1 TABLET BY MOUTH EVERY MONDAY WEDNESDAY  AND FRIDAY BEFORE DIALYSIS      calcitRIOL (ROCALTROL) 0.25 MCG capsule Calcitriol Calcitriol (Rocaltrol) 0.25 MCG Capsule Active 0.25 MCG PO Daily February 5th, 2021 8:49pm 02-  [facility] ([id number])      FLUoxetine (PROZAC) 40 MG capsule TAKE ONE CAPSULE BY MOUTH DAILY 90 capsule 1    acetaminophen (TYLENOL) 325 MG tablet Take 650 mg by mouth every 6 hours as needed for Pain      calcium acetate 667 MG TABS Take 667 mg by mouth every 12 hours      vitamin D 25 MCG (1000 UT) CAPS Take 2,000 Units by mouth daily      sulfamethoxazole-trimethoprim (BACTRIM;SEPTRA) 400-80 MG per tablet Take 1 tablet by mouth      folic acid (FOLVITE) 104 MCG tablet Take 400 mcg by mouth daily      aspirin 81 MG tablet Take 81 mg by mouth daily      fluticasone (FLONASE) 50 MCG/ACT nasal spray 1 spray by Each Nostril route daily      cycloSPORINE (SANDIMMUNE) 100 MG capsule Take 100 mg by mouth 2 times daily      cycloSPORINE (SANDIMMUNE) 25 MG capsule Take 25 mg by mouth daily      valGANciclovir (VALCYTE) 450 MG tablet Take 450 mg by mouth      calcium carbonate (TUMS) 500 MG chewable tablet Take 2 tablets by mouth daily      nitroGLYCERIN (NITROSTAT) 0.4 MG SL tablet Place 0.4 mg under the tongue every 5 minutes as needed for Chest pain up to max of 3 total doses.  If no relief after 1 dose, call 911.      guaiFENesin 400 MG tablet Take 400 mg by mouth 4 times daily as needed for Cough      Simethicone 40 MG STRP Take by mouth      Multiple Vitamins-Minerals (THERAPEUTIC MULTIVITAMIN-MINERALS) tablet Take 1 tablet by mouth daily       No current facility-administered medications on file prior to visit.        Allergies   Allergen Reactions    Ceftriaxone Itching     Itching at IV site & red streak along the arm within 5 minutes of infusion      Heparin Other (See Comments)     MEÑO      Lorazepam Other (See Comments)    Tacrolimus Other (See Comments)     Agitation         Past Medical History:   Diagnosis Date    Allergic rhinitis     CAD (coronary artery disease)     Ishcemia CABS X 2 4/20/17 [facility]    COPD (chronic obstructive pulmonary disease) (HCC)     O2 3L PNC    Emphysema (subcutaneous) (surgical) resulting from a procedure     Upper Lobes    Emphysema lung (HCC)     GERD (gastroesophageal reflux disease)     Hyperlipidemia     Hypertension     IFG (impaired fasting glucose)     Obesity     Pulmonary fibrosis (HCC)     Benign Nodules - restrictive    Vitamin D insufficiency      Past Surgical History:   Procedure Laterality Date    APPENDECTOMY      BRONCHOSCOPY  05/12/2017    Bedside - 6/18/18 - [place] - [facility] - 8/13/18    CABG WITH AORTIC VALVE REPAIR  04/20/2017    X 2 [facility]    CATARACT REMOVAL WITH IMPLANT Bilateral     OS 11/7/18, OD 11/19/18 - [place]    CHOLECYSTECTOMY      DIAPHRAGM SURGERY Left 08/11/2017    LUNG DECORTICATION      LUNG TRANSPLANT, 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    LUNG TRANSPLANT, DOUBLE      THORACOTOMY      TRACHEOSTOMY  05/01/2017    VOCAL CORD SURGERY      Nodules, Skin Lesions - [facility]     Family History   Problem Relation Age of Onset    Heart Failure Mother     Other Father         AAA    Cancer Brother         Lung    Coronary Art Dis Brother     Heart Failure Brother         Methothelioma     Social History     Socioeconomic History    Marital status:      Spouse name: Not on file    Number of children: Not on file    Years of education: Not on file    Highest education level: Not on file Occupational History    Not on file   Tobacco Use    Smoking status: Former     Packs/day: 2.00     Years: 35.00     Pack years: 70.00     Types: Cigarettes     Quit date: 1997     Years since quittin.9    Smokeless tobacco: Never    Tobacco comments:     Quit 18 years ago   Substance and Sexual Activity    Alcohol use: Never    Drug use: Never    Sexual activity: Not on file   Other Topics Concern    Not on file   Social History Narrative    Not on file     Social Determinants of Health     Financial Resource Strain: Not on file   Food Insecurity: Not on file   Transportation Needs: Not on file   Physical Activity: Not on file   Stress: Not on file   Social Connections: Not on file   Intimate Partner Violence: Not on file   Housing Stability: Not on file       Vitals:    22 1119   BP: (!) 112/54   Pulse: 72   Temp: 98.3 °F (36.8 °C)   SpO2: 96%   Weight: 189 lb (85.7 kg)   Height: 5' 10\" (1.778 m)       Physical Exam:  Physical Exam  Vitals and nursing note reviewed. Constitutional:       General: He is not in acute distress. Appearance: Normal appearance. He is well-developed. He is obese. He is ill-appearing (chronically). HENT:      Head: Normocephalic and atraumatic. Right Ear: Hearing and external ear normal.      Left Ear: Hearing and external ear normal.      Nose:      Comments: Patient wearing mask  Eyes:      General: Lids are normal. No scleral icterus. Extraocular Movements: Extraocular movements intact. Conjunctiva/sclera: Conjunctivae normal.   Neck:      Thyroid: No thyromegaly. Cardiovascular:      Rate and Rhythm: Normal rate and regular rhythm. Heart sounds: Normal heart sounds. No murmur heard. Pulmonary:      Effort: Pulmonary effort is normal. No respiratory distress. Breath sounds: Normal breath sounds. No wheezing. Musculoskeletal:         General: No tenderness or deformity. Normal range of motion.       Cervical back: Normal range of motion and neck supple. Right lower leg: No edema. Left lower leg: No edema. Lymphadenopathy:      Cervical: No cervical adenopathy. Skin:     General: Skin is warm and dry. Findings: Rash (flesh colored papules noted under chin) present. Neurological:      General: No focal deficit present. Mental Status: He is alert and oriented to person, place, and time. Gait: Gait abnormal (using cane). Psychiatric:         Mood and Affect: Mood and affect normal.         Speech: Speech normal.         Behavior: Behavior normal.         Thought Content:  Thought content normal.         Labs:  CBC with Differential:    Lab Results   Component Value Date/Time    WBC 6.0 04/05/2022 05:32 AM    RBC 2.28 04/05/2022 05:32 AM    HGB 7.9 04/05/2022 05:32 AM    HCT 24.0 04/05/2022 05:32 AM     04/05/2022 05:32 AM    .0 04/05/2022 05:32 AM    MCH 34.7 04/05/2022 05:32 AM    MCHC 33.1 04/05/2022 05:32 AM    RDW 17.4 04/05/2022 05:32 AM    NRBC 0.9 01/18/2022 02:36 PM    SEGSPCT 73.7 04/05/2022 05:32 AM    LYMPHOPCT 16.0 04/05/2022 05:32 AM    MONOPCT 7.8 04/05/2022 05:32 AM    BASOPCT 0.7 04/05/2022 05:32 AM    MONOSABS 0.5 04/05/2022 05:32 AM    LYMPHSABS 1.0 04/05/2022 05:32 AM    EOSABS 0.1 04/05/2022 05:32 AM    BASOSABS 0.0 04/05/2022 05:32 AM     CMP:    Lab Results   Component Value Date/Time     04/05/2022 05:32 AM    K 4.4 04/05/2022 05:32 AM    CL 99 04/05/2022 05:32 AM    CO2 28 04/05/2022 05:32 AM    BUN 59 04/05/2022 05:32 AM    CREATININE 6.4 04/05/2022 05:32 AM    GFRAA 8 02/28/2022 10:02 AM    AGRATIO 1.2 04/04/2022 05:39 AM    LABGLOM 9 04/05/2022 05:32 AM    GLUCOSE 91 04/05/2022 05:32 AM    PROT 5.8 04/04/2022 05:39 AM    LABALBU 3.2 04/04/2022 05:39 AM    CALCIUM 8.3 04/05/2022 05:32 AM    BILITOT 1.3 04/04/2022 05:39 AM    ALKPHOS 100 04/04/2022 05:39 AM    AST 33 04/04/2022 05:39 AM    ALT 28 04/04/2022 05:39 AM     HgBA1c:    Lab Results   Component Value Date/Time LABA1C 4.8 03/19/2022 05:48 AM     FLP:    Lab Results   Component Value Date/Time    TRIG 271 03/19/2022 05:48 AM    HDL 32 03/19/2022 05:48 AM    LDLCALC 61 01/06/2022 04:12 PM    LABVLDL 46 01/06/2022 04:12 PM     TSH:    Lab Results   Component Value Date/Time    TSH 1.24 03/19/2022 05:48 AM     PSA: No results found for: PSA       Assessment and Plan:  Moise Alvarez was seen today for discuss medications. Diagnoses and all orders for this visit:    Primary hypertension  Stable with current dosing. Will continue same for now- follow by Nephrology as well. Mixed hyperlipidemia  Suspicion that Crestor is causing leg aches/muscle pains. Will cut back to 3x/week. If no improvement in symptoms, will stop completely. Already taking CoQ10    Stage 5 chronic kidney disease on dialysis ([address]  Stable. Planning visit to [facility] at the end of the month for eval for transplant    Dependence on renal dialysis [facility])    Lung transplant status, bilateral ([address]  Stable breathing. Chronic diastolic CHF (congestive heart failure) (HCC)    S/P CABG x 2        Return in about 4 months (around 12/17/2022), or if symptoms worsen or fail to improve, for AWV.       Seen By:  Calixto Brennan DO

## 2022-08-22 LAB — CALCIUM SERPL-MCNC: 9.5 MG/DL (ref 8.6–10.2)

## 2022-08-29 LAB — CALCIUM SERPL-MCNC: 8.7 MG/DL (ref 8.6–10.2)

## 2022-11-28 DIAGNOSIS — I50.32 CHRONIC DIASTOLIC CHF (CONGESTIVE HEART FAILURE) (HCC): ICD-10-CM

## 2022-11-28 RX ORDER — CARVEDILOL 6.25 MG/1
TABLET ORAL
Qty: 90 TABLET | Refills: 1 | Status: SHIPPED | OUTPATIENT
Start: 2022-11-28

## 2022-12-07 ENCOUNTER — OFFICE VISIT (OUTPATIENT)
Dept: PRIMARY CARE CLINIC | Age: 72
End: 2022-12-07
Payer: MEDICARE

## 2022-12-07 VITALS
HEIGHT: 70 IN | SYSTOLIC BLOOD PRESSURE: 102 MMHG | OXYGEN SATURATION: 98 % | TEMPERATURE: 97.7 F | WEIGHT: 186 LBS | BODY MASS INDEX: 26.63 KG/M2 | HEART RATE: 88 BPM | DIASTOLIC BLOOD PRESSURE: 68 MMHG
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
## 2022-12-07 DIAGNOSIS — Z91.81 AT HIGH RISK FOR FALLS: ICD-10-CM

## 2022-12-07 DIAGNOSIS — M15.9 GENERALIZED OSTEOARTHRITIS: ICD-10-CM

## 2022-12-07 DIAGNOSIS — F51.01 PRIMARY INSOMNIA: ICD-10-CM

## 2022-12-07 DIAGNOSIS — I25.118 CORONARY ARTERY DISEASE OF NATIVE HEART WITH STABLE ANGINA PECTORIS, UNSPECIFIED VESSEL OR LESION TYPE (HCC): ICD-10-CM

## 2022-12-07 DIAGNOSIS — Z00.00 MEDICARE ANNUAL WELLNESS VISIT, SUBSEQUENT: Primary | ICD-10-CM

## 2022-12-07 PROCEDURE — 3078F DIAST BP <80 MM HG: CPT | Performed by: FAMILY MEDICINE

## 2022-12-07 PROCEDURE — 3074F SYST BP LT 130 MM HG: CPT | Performed by: FAMILY MEDICINE

## 2022-12-07 PROCEDURE — G0439 PPPS, SUBSEQ VISIT: HCPCS | Performed by: FAMILY MEDICINE

## 2022-12-07 PROCEDURE — 1123F ACP DISCUSS/DSCN MKR DOCD: CPT | Performed by: FAMILY MEDICINE

## 2022-12-07 RX ORDER — TRAZODONE HYDROCHLORIDE 50 MG/1
50 TABLET ORAL NIGHTLY
Qty: 90 TABLET | Refills: 1 | Status: SHIPPED | OUTPATIENT
Start: 2022-12-07

## 2022-12-07 RX ORDER — ROSUVASTATIN CALCIUM 5 MG/1
TABLET, COATED ORAL
Qty: 90 TABLET | Refills: 1 | Status: SHIPPED | OUTPATIENT
Start: 2022-12-07

## 2022-12-07 ASSESSMENT — PATIENT HEALTH QUESTIONNAIRE - PHQ9
2. FEELING DOWN, DEPRESSED OR HOPELESS: 0
SUM OF ALL RESPONSES TO PHQ QUESTIONS 1-9: 0
SUM OF ALL RESPONSES TO PHQ9 QUESTIONS 1 & 2: 0
1. LITTLE INTEREST OR PLEASURE IN DOING THINGS: 0
SUM OF ALL RESPONSES TO PHQ QUESTIONS 1-9: 0

## 2022-12-07 ASSESSMENT — LIFESTYLE VARIABLES
HOW OFTEN DO YOU HAVE A DRINK CONTAINING ALCOHOL: NEVER
HOW MANY STANDARD DRINKS CONTAINING ALCOHOL DO YOU HAVE ON A TYPICAL DAY: PATIENT DOES NOT DRINK

## 2022-12-07 NOTE — PATIENT INSTRUCTIONS
Preventing Falls: Care Instructions  Your Care Instructions     Getting around your home safely can be a challenge if you have injuries or health problems that make it easy for you to fall. Loose rugs and furniture in walkways are among the dangers for many older people who have problems walking or who have poor eyesight. People who have conditions such as arthritis, osteoporosis, or dementia also have to be careful not to fall. You can make your home safer with a few simple measures. Follow-up care is a key part of your treatment and safety. Be sure to make and go to all appointments, and call your doctor if you are having problems. It's also a good idea to know your test results and keep a list of the medicines you take. How can you care for yourself at home? Taking care of yourself  Exercise regularly to improve your strength, muscle tone, and balance. Walk if you can. Swimming may be a good choice if you cannot walk easily. Have your vision and hearing checked each year or any time you notice a change. If you have trouble seeing and hearing, you might not be able to avoid objects and could lose your balance. Know the side effects of the medicines you take. Ask your doctor or pharmacist whether the medicines you take can affect your balance. Sleeping pills or sedatives can affect your balance. Limit the amount of alcohol you drink. Alcohol can impair your balance and other senses. Ask your doctor whether calluses or corns on your feet need to be removed. If you wear loose-fitting shoes because of calluses or corns, you can lose your balance and fall. Talk to your doctor if you have numbness in your feet. You may get dizzy if you do not drink enough water. To prevent dehydration, drink plenty of fluids. Choose water and other clear liquids. If you have kidney, heart, or liver disease and have to limit fluids, talk with your doctor before you increase the amount of fluids you drink.   Preventing falls at home  Remove raised doorway thresholds, throw rugs, and clutter. Repair loose carpet or raised areas in the floor. Move furniture and electrical cords to keep them out of walking paths. Use nonskid floor wax, and wipe up spills right away, especially on ceramic tile floors. If you use a walker or cane, put rubber tips on it. If you use crutches, clean the bottoms of them regularly with an abrasive pad, such as steel wool. Keep your house well lit, especially stairways, porches, and outside walkways. Use night-lights in areas such as hallways and bathrooms. Add extra light switches or use remote switches (such as switches that go on or off when you clap your hands) to make it easier to turn lights on if you have to get up during the night. Install sturdy handrails on stairways. Move items in your cabinets so that the things you use a lot are on the lower shelves (about waist level). Keep a cordless phone and a flashlight with new batteries by your bed. If possible, put a phone in each of the main rooms of your house, or carry a cell phone in case you fall and cannot reach a phone. Or, you can wear a device around your neck or wrist. You push a button that sends a signal for help. Wear low-heeled shoes that fit well and give your feet good support. Use footwear with nonskid soles. Check the heels and soles of your shoes for wear. Repair or replace worn heels or soles. Do not wear socks without shoes on wood floors. Walk on the grass when the sidewalks are slippery. If you live in an area that gets snow and ice in the winter, sprinkle salt on slippery steps and sidewalks. Or ask a family member or friend to do this for you. Preventing falls in the bath  Install grab bars and nonskid mats inside and outside your shower or tub and near the toilet and sinks. Use shower chairs and bath benches. Use a hand-held shower head that will allow you to sit while showering.   Get into a tub or shower by putting the weaker leg in first. Get out of a tub or shower with your strong side first.  Repair loose toilet seats and consider installing a raised toilet seat to make getting on and off the toilet easier. Keep your bathroom door unlocked while you are in the shower. Where can you learn more? Go to https://chpepiceweb.[email or web address] org and sign in to your [employer] account. Enter [id number] in the [facility] box to learn more about \"Preventing Falls: Care Instructions. \"     If you do not have an account, please click on the \"Sign Up Now\" link. Current as of: May 4, 2022               Content Version: 13.4  © 2006-2022 Healthwise, [employer]. Care instructions adapted under license by [facility] ([facility]). If you have questions about a medical condition or this instruction, always ask your healthcare professional. [place] [address] any warranty or liability for your use of this information. Fatigue: Care Instructions  Your Care Instructions     Fatigue is a feeling of tiredness, exhaustion, or lack of energy. You may feel fatigue because of too much or not enough activity. It can also come from stress, lack of sleep, boredom, and poor diet. Many medical problems, such as viral infections, can cause fatigue. Emotional problems, especially depression, are often the cause of fatigue. Fatigue is most often a symptom of another problem. Treatment for fatigue depends on the cause. For example, if you have fatigue because you have a certain health problem, treating this problem also treats your fatigue. If depression or anxiety is the cause, treatment may help. Follow-up care is a key part of your treatment and safety. Be sure to make and go to all appointments, and call your doctor if you are having problems. It's also a good idea to know your test results and keep a list of the medicines you take. How can you care for yourself at home? Get regular exercise. But don't overdo it.  Go back and forth between rest and exercise. Get plenty of rest.  Eat a healthy diet. Do not skip meals, especially breakfast.  Reduce your use of caffeine, tobacco, and alcohol. Caffeine is most often found in coffee, tea, cola drinks, and chocolate. Limit medicines that can cause fatigue. This includes tranquilizers and cold and allergy medicines. When should you call for help? Watch closely for changes in your health, and be sure to contact your doctor if:    You have new symptoms such as fever or a rash.     Your fatigue gets worse.     You have been feeling down, depressed, or hopeless. Or you may have lost interest in things that you usually enjoy.     You are not getting better as expected. Where can you learn more? Go to https://MeilleurMobile.[email or web address] org and sign in to your mobintent account. Enter K626 in the [employer] box to learn more about \"Fatigue: Care Instructions. \"     If you do not have an account, please click on the \"Sign Up Now\" link. Current as of: February 9, 2022               Content Version: 13.4  © 3152-7662 [employer]. Care instructions adapted under license by [address] [place] [address]. If you have questions about a medical condition or this instruction, always ask your healthcare professional. [place] [address] any warranty or liability for your use of this information. Hearing Loss: Care Instructions  Overview     Hearing loss is a sudden or slow decrease in how well you hear. It can range from mild to severe. Permanent hearing loss can occur with aging. It also can happen when you are exposed long-term to loud noise. Examples include listening to loud music, riding motorcycles, or being around other loud machines. Hearing loss can affect your work and home life. It can make you feel lonely or depressed. You may feel that you have lost your independence.  But hearing aids and other devices can help you hear better and feel connected to others. Follow-up care is a key part of your treatment and safety. Be sure to make and go to all appointments, and call your doctor if you are having problems. It's also a good idea to know your test results and keep a list of the medicines you take. How can you care for yourself at home? Avoid loud noises whenever possible. This helps keep your hearing from getting worse. Always wear hearing protection around loud noises. Wear a hearing aid as directed. See a professional who can help you pick a hearing aid that fits you. Have hearing tests as your doctor suggests. They can show whether your hearing has changed. Your hearing aid may need to be adjusted. Use other devices as needed. These may include:  Telephone amplifiers and hearing aids that can connect to a television, stereo, radio, or microphone. Devices that use lights or vibrations. These alert you to the doorbell, a ringing telephone, or a baby monitor. Television closed-captioning. This shows the words at the bottom of the screen. Most new TVs can do this. TTY (text telephone). This lets you type messages back and forth on the telephone instead of talking or listening. These devices are also called TDD. When messages are typed on the keyboard, they are sent over the phone line to a receiving TTY. The message is shown on a monitor. Use text messaging, social media, and email if it is hard for you to communicate by telephone. Try to learn a listening technique called speechreading. It is not lipreading. You pay attention to people's gestures, expressions, posture, and tone of voice. These clues can help you understand what a person is saying. Face the person you are talking to, and have them face you. Make sure the lighting is good. You need to see the other person's face clearly. Think about counseling if you need help to adjust to your hearing loss. When should you call for help?   Watch closely for changes in your health, and be sure to contact your doctor if:    You think your hearing is getting worse.     You have new symptoms, such as dizziness or nausea. Where can you learn more? Go to https://chpepiceweb.[email or web address] org and sign in to your [employer]mo account. Enter W377 in the [employer] box to learn more about \"Hearing Loss: Care Instructions. \"     If you do not have an account, please click on the \"Sign Up Now\" link. Current as of: May 4, 2022               Content Version: 13.4  © 2006-2022 Healthwise, [employer]. Care instructions adapted under license by [facility] ([facility]). If you have questions about a medical condition or this instruction, always ask your healthcare professional. [address] any warranty or liability for your use of this information. Learning About Activities of Daily Living  What are activities of daily living? Activities of daily living (ADLs) are the basic self-care tasks you do every day. As you age, and if you have health problems, you may find that it's harder to do these things for yourself. That's when you may need some help. Your doctor uses ADLs to measure how much help you need. Knowing what you can and can't do for yourself is an important first step to getting help. And when you have the help you need, you can stay as independent as possible. Your doctor will want to know if you are able to do tasks such as: Take a bath or shower without help. Go to the bathroom by yourself. Dress and undress without help. Shave, comb your hair, and brush teeth on your own. Get in and out of bed or a chair without help. Feed yourself without help. If you are having trouble doing basic self-care tasks, talk with your doctor. You may want to bring a caregiver or family member who can help the doctor understand your needs and abilities. How will a doctor assess your ADLs?   Asking about ADLs is part of a routine health checkup your doctor will likely do as you age. Your health check might be done in a doctor's office, in your home, or at a hospital. The goal is to find out if you are having any problems that could make your health problems worse or that make it unsafe for you to be on your own. To measure your ADLs, your doctor will ask how hard it is for you to do routine tasks. He or she may also want to know if you have changed the way you do a task because of a health problem. He or she may watch how you:  Walk back and forth. Keep your balance while you stand or walk. Move from sitting to standing or from a bed to a chair. Button or unbutton a shirt or sweater. Remove and put on your shoes. It's normal to feel a little worried or anxious if you find you can't do all the things you used to be able to do. Talking with your doctor about ADLs isn't a test that you either pass or fail. It's just a way to get more information about your health and safety. Follow-up care is a key part of your treatment and safety. Be sure to make and go to all appointments, and call your doctor if you are having problems. It's also a good idea to know your test results and keep a list of the medicines you take. Current as of: October 6, 2021               Content Version: 13.4  © 2006-2022 Healthwise, Incorporated. Care instructions adapted under license by [facility] ([facility]). If you have questions about a medical condition or this instruction, always ask your healthcare professional. [place] [address] any warranty or liability for your use of this information. Advance Directives: Care Instructions  Overview  An advance directive is a legal way to state your wishes at the end of your life. It tells your family and your doctor what to do if you can't say what you want. There are two main types of advance directives. You can change them any time your wishes change. Living will.   This form tells your family and your doctor your wishes about life support and other treatment. The form is also called a declaration. Medical power of . This form lets you name a person to make treatment decisions for you when you can't speak for yourself. This person is called a health care agent (health care proxy, health care surrogate). The form is also called a durable power of  for health care. If you do not have an advance directive, decisions about your medical care may be made by a family member, or by a doctor or a  who doesn't know you. It may help to think of an advance directive as a gift to the people who care for you. If you have one, they won't have to make tough decisions by themselves. Follow-up care is a key part of your treatment and safety. Be sure to make and go to all appointments, and call your doctor if you are having problems. It's also a good idea to know your test results and keep a list of the medicines you take. What should you include in an advance directive? Many states have a unique advance directive form. (It may ask you to address specific issues.) Or you might use a universal form that's approved by many states. If your form doesn't tell you what to address, it may be hard to know what to include in your advance directive. Use the questions below to help you get started. Who do you want to make decisions about your medical care if you are not able to? What life-support measures do you want if you have a serious illness that gets worse over time or can't be cured? What are you most afraid of that might happen? (Maybe you're afraid of having pain, losing your independence, or being kept alive by machines.)  Where would you prefer to die? (Your home? A hospital? A nursing home?)  Do you want to donate your organs when you die? Do you want certain Yazidi practices performed before you die? When should you call for help? Be sure to contact your doctor if you have any questions. Where can you learn more?   Go to https://chpepiceweb.[email or web address] org and sign in to your [employer] account. Enter R264 in the [facility] box to learn more about \"Advance Directives: Care Instructions. \"     If you do not have an account, please click on the \"Sign Up Now\" link. Current as of: June 16, 2022               Content Version: 13.4  © 1133-3439 Healthwise, [employer] Software. Care instructions adapted under license by [facility] ([facility]). If you have questions about a medical condition or this instruction, always ask your healthcare professional. [place] [address] any warranty or liability for your use of this information. Personalized Preventive Plan for Connor Bartlett - 12/7/2022  Medicare offers a range of preventive health benefits. Some of the tests and screenings are paid in full while other may be subject to a deductible, co-insurance, and/or copay. Some of these benefits include a comprehensive review of your medical history including lifestyle, illnesses that may run in your family, and various assessments and screenings as appropriate. After reviewing your medical record and screening and assessments performed today your provider may have ordered immunizations, labs, imaging, and/or referrals for you. A list of these orders (if applicable) as well as your Preventive Care list are included within your After Visit Summary for your review. Other Preventive Recommendations:    A preventive eye exam performed by an eye specialist is recommended every 1-2 years to screen for glaucoma; cataracts, macular degeneration, and other eye disorders. A preventive dental visit is recommended every 6 months. Try to get at least 150 minutes of exercise per week or 10,000 steps per day on a pedometer . Order or download the FREE \"Exercise & Physical Activity: Your Everyday Guide\" from [employer]. Call 3-981.517.6286 or search [employer] online.   You need 0354-7016 mg of calcium and 7590-1673 IU of vitamin D per day. It is possible to meet your calcium requirement with diet alone, but a vitamin D supplement is usually necessary to meet this goal.  When exposed to the sun, use a sunscreen that protects against both UVA and UVB radiation with an SPF of 30 or greater. Reapply every 2 to 3 hours or after sweating, drying off with a towel, or swimming. Always wear a seat belt when traveling in a car. Always wear a helmet when riding a bicycle or motorcycle.

## 2022-12-07 NOTE — PROGRESS NOTES
Medicare Annual Wellness Visit    Arnold Silverman is here for Medicare AWV    Assessment & Plan   Medicare annual wellness visit, subsequent  HRA reviewed and addressed. UTD on HM. Had flu shot through dialysis. Coronary artery disease of native heart with stable angina pectoris, unspecified vessel or lesion type (HCC)  -     rosuvastatin (CRESTOR) 5 MG tablet; TAKE ONE TABLET BY MOUTH EVERY DAY, Disp-90 tablet, R-1Normal    Primary insomnia  -     traZODone (DESYREL) 50 MG tablet; Take 1 tablet by mouth nightly, Disp-90 tablet, R-1Normal    Generalized osteoarthritis  -     External Referral To Physical Therapy    Fall, subsequent encounter  -     External Referral To Physical Therapy    At high risk for falls  -     External Referral To Physical Therapy        Recommendations for Preventive Services Due: see orders and patient instructions/AVS.  Recommended screening schedule for the next 5-10 years is provided to the patient in written form: see Patient Instructions/AVS.     Return in about 4 months (around 4/7/2023), or if symptoms worsen or fail to improve, for Chronic medical conditions. Subjective   The following acute and/or chronic problems were also addressed today:    Patient doing well overall- having some weakness issues with recurrent falls. Waiting for approval from the transplant committee in [facility] today. Patient's complete Health Risk Assessment and screening values have been reviewed and are found in Flowsheets. The following problems were reviewed today and where indicated follow up appointments were made and/or referrals ordered.     Positive Risk Factor Screenings with Interventions:    Fall Risk:  Do you feel unsteady or are you worried about falling? : (!) yes  2 or more falls in past year?: (!) yes  Fall with injury in past year?: (!) yes     Interventions:    Referral placed to physical therapy  See AVS for additional education material  See A/P for any pertinent orders            General HRA Questions:  Select all that apply: (!) New or Increased Fatigue    Fatigue Interventions: On Dialysis and multiple medications. Safety:  Do you have either shower bars, grab bars, non-slip mats or non-slip surfaces in your shower or bathtub?: (!) No  Interventions:  Patient has all of these devices    ADL's:   Patient reports needing help with:  Select all that apply: (!) Walking/Balance (does use walker)  Interventions:  See AVS for additional education material  See A/P for any pertinent orders              Objective   Vitals:    12/07/22 1107   BP: 102/68   Pulse: 88   Temp: 97.7 °F (36.5 °C)   SpO2: 98%   Weight: 186 lb (84.4 kg)   Height: 5' 10\" (1.778 m)      Body mass index is 26.69 kg/m². Physical Exam  Vitals and nursing note reviewed. Constitutional:       General: He is not in acute distress. Appearance: Normal appearance. He is well-developed and normal weight. He is ill-appearing (chronically). HENT:      Head: Normocephalic and atraumatic. Right Ear: Hearing and external ear normal.      Left Ear: Hearing and external ear normal.      Nose: Nose normal.      Mouth/Throat:      Mouth: Mucous membranes are moist.   Eyes:      General: Lids are normal. No scleral icterus. Extraocular Movements: Extraocular movements intact. Conjunctiva/sclera: Conjunctivae normal.   Neck:      Thyroid: No thyromegaly. Cardiovascular:      Rate and Rhythm: Normal rate and regular rhythm. Heart sounds: Normal heart sounds. No murmur heard. Pulmonary:      Effort: Pulmonary effort is normal. No respiratory distress. Breath sounds: Normal breath sounds. No wheezing. Musculoskeletal:         General: No tenderness or deformity. Normal range of motion. Cervical back: Normal range of motion and neck supple. Right lower leg: No edema. Left lower leg: No edema. Lymphadenopathy:      Cervical: No cervical adenopathy.    Skin: General: Skin is warm and dry. Findings: Rash (flesh colored papules noted under chin) present. Neurological:      General: No focal deficit present. Mental Status: He is alert and oriented to person, place, and time. Gait: Gait abnormal (using walker). Psychiatric:         Mood and Affect: Mood and affect normal.         Speech: Speech normal.         Behavior: Behavior normal.         Thought Content: Thought content normal.          Allergies   Allergen Reactions    Ceftriaxone Itching     Itching at IV site & red streak along the arm within 5 minutes of infusion      Heparin Other (See Comments)     MEÑO      Lorazepam Other (See Comments)    Tacrolimus Other (See Comments)     Agitation       Prior to Visit Medications    Medication Sig Taking? Authorizing Provider   traZODone (DESYREL) 50 MG tablet Take 1 tablet by mouth nightly Yes Cristiane Elias DO   rosuvastatin (CRESTOR) 5 MG tablet TAKE ONE TABLET BY MOUTH EVERY DAY Yes Cristiane Elias DO   carvedilol (COREG) 6.25 MG tablet TAKE ONE TABLET BY MOUTH DAILY Yes Cristiane Elias DO   isosorbide mononitrate (IMDUR) 30 MG extended release tablet Take 1 tablet by mouth daily Yes Cristiane Elias DO   predniSONE (DELTASONE) 5 MG tablet Take 1 tablet by mouth daily Yes Cristiane Elias DO   Coenzyme Q10 (CO Q-10) 100 MG CAPS Take by mouth Yes Historical Provider, MD   levalbuterol (XOPENEX) 1.25 MG/3ML nebulizer solution Inhale 1.25 mg into the lungs 3 times daily Yes Historical Provider, MD   lidocaine-prilocaine (EMLA) 2.5-2.5 % cream APPLY SMALL AMOUNT TO ACCESS SITE (AVF) 1 HOUR BEFORE DIALYSIS.  COVER WITH OCCLUSIVE DRESSING (SARAN WRAP) Yes Historical Provider, MD   pantoprazole (PROTONIX) 40 MG tablet TAKE ONE TABLET BY MOUTH TWO TIMES A DAY BEFORE MEALS (6AM AND 4PM) Yes Historical Provider, MD   mirtazapine (REMERON SOL-TAB) 15 MG disintegrating tablet DISSOLVE ONE TABLET IN MOUTH DAILY AT BEDTIME Yes Historical Provider, MD famotidine (PEPCID) 40 MG tablet Take 40 mg by mouth daily Yes Historical Provider, MD   SPS 15 GM/60ML suspension TAKE 60 ML ON SUNDAY FOR 4 WEEKS Yes Historical Provider, MD   ondansetron (ZOFRAN) 4 MG tablet Take 4 mg by mouth every 8 hours as needed Yes Historical Provider, MD   midodrine (PROAMATINE) 10 MG tablet TAKE 1 TABLET BY MOUTH EVERY MONDAY WEDNESDAY  AND FRIDAY BEFORE DIALYSIS Yes Historical Provider, MD   calcitRIOL (ROCALTROL) 0.25 MCG capsule Calcitriol Calcitriol (Rocaltrol) 0.25 MCG Capsule Active 0.25 MCG PO Daily February 5th, 2021 8:49pm 02-  [facility] ([id number]) Yes Historical Provider, MD   FLUoxetine (PROZAC) 40 MG capsule TAKE ONE CAPSULE BY MOUTH DAILY Yes Megan Quant, DO   acetaminophen (TYLENOL) 325 MG tablet Take 650 mg by mouth every 6 hours as needed for Pain Yes Historical Provider, MD   calcium acetate 667 MG TABS Take 667 mg by mouth every 12 hours Yes Historical Provider, MD   vitamin D 25 MCG (1000 UT) CAPS Take 2,000 Units by mouth daily Yes Historical Provider, MD   sulfamethoxazole-trimethoprim (BACTRIM;SEPTRA) 400-80 MG per tablet Take 1 tablet by mouth Yes Historical Provider, MD   folic acid (FOLVITE) 794 MCG tablet Take 400 mcg by mouth daily Yes Historical Provider, MD   aspirin 81 MG tablet Take 81 mg by mouth daily Yes Historical Provider, MD   fluticasone (FLONASE) 50 MCG/ACT nasal spray 1 spray by Each Nostril route daily Yes Historical Provider, MD   cycloSPORINE (SANDIMMUNE) 100 MG capsule Take 100 mg by mouth 2 times daily Yes Historical Provider, MD   cycloSPORINE (SANDIMMUNE) 25 MG capsule Take 25 mg by mouth daily Yes Historical Provider, MD   valGANciclovir (VALCYTE) 450 MG tablet Take 450 mg by mouth Yes Historical Provider, MD   calcium carbonate (TUMS) 500 MG chewable tablet Take 2 tablets by mouth daily Yes Historical Provider, MD   nitroGLYCERIN (NITROSTAT) 0.4 MG SL tablet Place 0.4 mg under the tongue every 5 minutes as needed for Chest pain up to max of 3 total doses. If no relief after 1 dose, call 911.  Yes Historical Provider, MD   guaiFENesin 400 MG tablet Take 400 mg by mouth 4 times daily as needed for Cough Yes Historical Provider, MD   Simethicone 40 MG STRP Take by mouth Yes Historical Provider, MD   Multiple Vitamins-Minerals (THERAPEUTIC MULTIVITAMIN-MINERALS) tablet Take 1 tablet by mouth daily Yes Historical Provider, MD       CareTeam (Including outside providers/suppliers regularly involved in providing care):   Patient Care Team:  Barak Greenberg DO as PCP - General (Family Medicine)  Barak Greenberg DO as PCP - [facility] Empaneled Provider  Joselyn Briceño DO     Reviewed and updated this visit:  Tobacco  Allergies  Meds  Problems  Med Hx  Surg Hx  Soc Hx  Fam Hx

## 2022-12-29 ENCOUNTER — TELEPHONE (OUTPATIENT)
Dept: PRIMARY CARE CLINIC | Age: 72
End: 2022-12-29

## 2022-12-29 DIAGNOSIS — R42 DIZZINESS: Primary | ICD-10-CM

## 2022-12-29 NOTE — TELEPHONE ENCOUNTER
[facility] calling. Patient being treated for OA and balance difficulty. Having dizzy spells recently and advised PT that in the past he has has vestibular physical therapy. They are asking if this is something you would order for him at [facility].          Fax. [phone]

## 2023-01-05 ENCOUNTER — TELEPHONE (OUTPATIENT)
Dept: FAMILY MEDICINE CLINIC | Age: 73
End: 2023-01-05

## 2023-01-05 NOTE — TELEPHONE ENCOUNTER
Lydia Almendarez called from [facility]. She is looking for a signed plan of care ? Have we seen this? The certification period is 12/16/22 to 1/26/23.

## 2023-01-13 ENCOUNTER — OFFICE VISIT (OUTPATIENT)
Dept: FAMILY MEDICINE CLINIC | Age: 73
End: 2023-01-13

## 2023-01-13 VITALS
HEIGHT: 64 IN | HEART RATE: 95 BPM | TEMPERATURE: 97.3 F | SYSTOLIC BLOOD PRESSURE: 144 MMHG | WEIGHT: 189 LBS | OXYGEN SATURATION: 97 % | BODY MASS INDEX: 32.27 KG/M2 | DIASTOLIC BLOOD PRESSURE: 62 MMHG

## 2023-01-13 DIAGNOSIS — M25.512 ACUTE PAIN OF LEFT SHOULDER: Primary | ICD-10-CM

## 2023-01-13 RX ORDER — TRIAMCINOLONE ACETONIDE 40 MG/ML
60 INJECTION, SUSPENSION INTRA-ARTICULAR; INTRAMUSCULAR ONCE
Status: COMPLETED | OUTPATIENT
Start: 2023-01-13 | End: 2023-01-13

## 2023-01-13 RX ADMIN — TRIAMCINOLONE ACETONIDE 60 MG: 40 INJECTION, SUSPENSION INTRA-ARTICULAR; INTRAMUSCULAR at 10:51

## 2023-01-13 NOTE — PROGRESS NOTES
23  Adalberto Benson : 1950 Sex: male  Age 67 y.o. Subjective:  Chief Complaint   Patient presents with    Shoulder Injury     lt         HPI:   Adalberto Benson , 67 y.o. male presents to express care for evaluation of left shoulder pain    HPI  74-year-old male presents to express care for evaluation of left shoulder pain. The patient has had this left shoulder pain ongoing for the last couple of days. The patient has noted that it hurts to move. The patient has had something similar in the past.  The patient has not any chest pain, shortness of breath. No syncope. No fevers. The patient denies any numbness. The patient denied any elbow pain or wrist pain. The patient denies any other injuries or complaints at this time. He has received injections in the shoulder that do seem to help with the symptoms. ROS:   Unless otherwise stated in this report the patient's positive and negative responses for review of systems for constitutional, eyes, ENT, cardiovascular, respiratory, gastrointestinal, neurological, , musculoskeletal, and integument systems and related systems to the presenting problem are either stated in the history of present illness or were not pertinent or were negative for the symptoms and/or complaints related to the presenting medical problem. Positives and pertinent negatives as per HPI. All others reviewed and are negative.       PMH:     Past Medical History:   Diagnosis Date    Allergic rhinitis     CAD (coronary artery disease)     Ishcemia CABS X 2 17 [facility]    COPD (chronic obstructive pulmonary disease) (HCC)     O2 3L PNC    Emphysema (subcutaneous) (surgical) resulting from a procedure     Upper Lobes    Emphysema lung (HCC)     GERD (gastroesophageal reflux disease)     Hyperlipidemia     Hypertension     IFG (impaired fasting glucose)     Obesity     Pulmonary fibrosis (HCC)     Benign Nodules - restrictive    Vitamin D insufficiency        Past Surgical History:   Procedure Laterality Date    APPENDECTOMY      BRONCHOSCOPY  05/12/2017    Bedside - 6/18/18 - [facility] - [facility] - 8/13/18    CABG WITH AORTIC VALVE REPAIR  04/20/2017    X 2 [facility]    CATARACT REMOVAL WITH IMPLANT Bilateral     OS 11/7/18, OD 11/19/18 - [facility]    CHOLECYSTECTOMY      DIAPHRAGM SURGERY Left 08/11/2017    LUNG DECORTICATION      LUNG TRANSPLANT, 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    LUNG TRANSPLANT, DOUBLE      THORACOTOMY      TRACHEOSTOMY  05/01/2017    VOCAL CORD SURGERY      Nodules, Skin Lesions - [facility]       Family History   Problem Relation Age of Onset    Heart Failure Mother     Other Father         AAA    Cancer Brother         Lung    Coronary Art Dis Brother     Heart Failure Brother         Methothelioma       Medications:     Current Outpatient Medications:     traZODone (DESYREL) 50 MG tablet, Take 1 tablet by mouth nightly, Disp: 90 tablet, Rfl: 1    rosuvastatin (CRESTOR) 5 MG tablet, TAKE ONE TABLET BY MOUTH EVERY DAY, Disp: 90 tablet, Rfl: 1    carvedilol (COREG) 6.25 MG tablet, TAKE ONE TABLET BY MOUTH DAILY, Disp: 90 tablet, Rfl: 1    isosorbide mononitrate (IMDUR) 30 MG extended release tablet, Take 1 tablet by mouth daily, Disp: 90 tablet, Rfl: 1    predniSONE (DELTASONE) 5 MG tablet, Take 1 tablet by mouth daily, Disp: 90 tablet, Rfl: 1    Coenzyme Q10 (CO Q-10) 100 MG CAPS, Take by mouth, Disp: , Rfl:     levalbuterol (XOPENEX) 1.25 MG/3ML nebulizer solution, Inhale 1.25 mg into the lungs 3 times daily, Disp: , Rfl:     lidocaine-prilocaine (EMLA) 2.5-2.5 % cream, APPLY SMALL AMOUNT TO ACCESS SITE (AVF) 1 HOUR BEFORE DIALYSIS.  COVER WITH OCCLUSIVE DRESSING (SARAN WRAP), Disp: , Rfl:     pantoprazole (PROTONIX) 40 MG tablet, TAKE ONE TABLET BY MOUTH TWO TIMES A DAY BEFORE MEALS (6AM AND 4PM), Disp: , Rfl:     mirtazapine (REMERON SOL-TAB) 15 MG disintegrating tablet, DISSOLVE ONE TABLET IN MOUTH DAILY AT BEDTIME, Disp: , Rfl:     famotidine (PEPCID) 40 MG tablet, Take 40 mg by mouth daily, Disp: , Rfl:     ondansetron (ZOFRAN) 4 MG tablet, Take 4 mg by mouth every 8 hours as needed, Disp: , Rfl:     midodrine (PROAMATINE) 10 MG tablet, TAKE 1 TABLET BY MOUTH EVERY MONDAY WEDNESDAY  AND FRIDAY BEFORE DIALYSIS, Disp: , Rfl:     calcitRIOL (ROCALTROL) 0.25 MCG capsule, Calcitriol Calcitriol (Rocaltrol) 0.25 MCG Capsule Active 0.25 MCG PO Daily February 5th, 2021 8:49pm 02-  [facility] ([id number]), Disp: , Rfl:     FLUoxetine (PROZAC) 40 MG capsule, TAKE ONE CAPSULE BY MOUTH DAILY, Disp: 90 capsule, Rfl: 1    acetaminophen (TYLENOL) 325 MG tablet, Take 650 mg by mouth every 6 hours as needed for Pain, Disp: , Rfl:     vitamin D 25 MCG (1000 UT) CAPS, Take 2,000 Units by mouth daily, Disp: , Rfl:     sulfamethoxazole-trimethoprim (BACTRIM;SEPTRA) 400-80 MG per tablet, Take 1 tablet by mouth, Disp: , Rfl:     folic acid (FOLVITE) 613 MCG tablet, Take 400 mcg by mouth daily, Disp: , Rfl:     aspirin 81 MG tablet, Take 81 mg by mouth daily, Disp: , Rfl:     fluticasone (FLONASE) 50 MCG/ACT nasal spray, 1 spray by Each Nostril route daily, Disp: , Rfl:     cycloSPORINE (SANDIMMUNE) 100 MG capsule, Take 100 mg by mouth 2 times daily, Disp: , Rfl:     cycloSPORINE (SANDIMMUNE) 25 MG capsule, Take 25 mg by mouth daily, Disp: , Rfl:     valGANciclovir (VALCYTE) 450 MG tablet, Take 450 mg by mouth, Disp: , Rfl:     calcium carbonate (TUMS) 500 MG chewable tablet, Take 2 tablets by mouth daily, Disp: , Rfl:     nitroGLYCERIN (NITROSTAT) 0.4 MG SL tablet, Place 0.4 mg under the tongue every 5 minutes as needed for Chest pain up to max of 3 total doses.  If no relief after 1 dose, call 911., Disp: , Rfl:     guaiFENesin 400 MG tablet, Take 400 mg by mouth 4 times daily as needed for Cough, Disp: , Rfl:     Simethicone 40 MG STRP, Take by mouth, Disp: , Rfl:     Multiple Vitamins-Minerals (THERAPEUTIC MULTIVITAMIN-MINERALS) tablet, Take 1 tablet by mouth daily, Disp: , Rfl:     SPS 15 GM/60ML suspension, TAKE 60 ML ON  FOR 4 WEEKS (Patient not taking: Reported on 2023), Disp: , Rfl:     calcium acetate 667 MG TABS, Take 667 mg by mouth every 12 hours (Patient not taking: Reported on 2023), Disp: , Rfl:     Allergies: Allergies   Allergen Reactions    Ceftriaxone Itching     Itching at IV site & red streak along the arm within 5 minutes of infusion      Heparin Other (See Comments)     MEÑO      Lorazepam Other (See Comments)    Tacrolimus Other (See Comments)     Agitation         Social History:     Social History     Tobacco Use    Smoking status: Former     Packs/day: 2.00     Years: 35.00     Pack years: 70.00     Types: Cigarettes     Quit date: 1997     Years since quittin.3    Smokeless tobacco: Never    Tobacco comments:     Quit 18 years ago   Substance Use Topics    Alcohol use: Never    Drug use: Never       Patient lives at home. Physical Exam:     Vitals:    23 1033   BP: (!) 144/62   Pulse: 95   Temp: 97.3 °F (36.3 °C)   TempSrc: Temporal   SpO2: 97%   Weight: 189 lb (85.7 kg)   Height: 5' 4\" (1.626 m)       Exam:  Physical Exam  Nurses note and vital signs reviewed and patient is not hypoxic. General: The patient appears well and in no apparent distress. Patient is resting comfortably on cart. Skin: Warm, dry, no pallor noted. There is no rash noted. Head: Normocephalic, atraumatic. Eye: Normal conjunctiva  Ears, Nose, Mouth, and Throat: Oral mucosa is moist  Cardiovascular: Regular Rate and Rhythm  Respiratory: Patient is in no distress, no accessory muscle use, lungs are clear to auscultation, no wheezing, crackles or rhonchi  Back: Non-tender, no CVA tenderness bilaterally to percussion. GI: Normal bowel sounds, no tenderness to palpation, no masses appreciated. No rebound, guarding, or rigidity noted.   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 The patient was able to abduct against resistance with 5/5 strength. The patient had no deficit with abduction. The patient had no pain with flexion or extension at the elbow or the wrist.  Pulses intact at radius 2+. Neurological: A&O x4, normal speech      Testing:           Medical Decision Making:     Vital signs reviewed    Past medical history reviewed. Allergies reviewed. Medications reviewed. Patient on arrival does not appear to be in any apparent distress or discomfort. The patient has been seen and evaluated. The patient does not appear to be toxic or lethargic. The patient was sent for x-rays of the left shoulder. The patient will be treated with IM injection of Kenalog here. X-rays show significant degenerative and arthritic changes. The patient is feeling better after the IM injection of Kenalog. He actually has better range of motion. The patient will continue to monitor symptoms. The patient will ice the area. We will have the patient follow-up with PCP. The patient is to return to express care or go directly to the emergency department should any of the signs or symptoms worsen. The patient is to followup with primary care physician in 2-3 days for repeat evaluation. The patient has no other questions or concerns at this time the patient will be discharged home. Clinical Impression:   Rita Reason was seen today for shoulder injury. Diagnoses and all orders for this visit:    Acute pain of left shoulder  -     XR SHOULDER LEFT (MIN 2 VIEWS); Future    Other orders  -     triamcinolone acetonide (KENALOG-40) injection 60 mg      The patient is to call for any concerns or return if any of the signs or symptoms worsen. The patient is to follow-up with PCP in the next 2-3 days for repeat evaluation repeat assessment or go directly to the emergency department.      SIGNATURE: Heloise Bosworth III, KAITLYNN

## 2023-01-23 ENCOUNTER — TELEPHONE (OUTPATIENT)
Dept: PRIMARY CARE CLINIC | Age: 73
End: 2023-01-23